# Patient Record
Sex: MALE | Race: WHITE | NOT HISPANIC OR LATINO | URBAN - METROPOLITAN AREA
[De-identification: names, ages, dates, MRNs, and addresses within clinical notes are randomized per-mention and may not be internally consistent; named-entity substitution may affect disease eponyms.]

---

## 2017-01-05 ENCOUNTER — OUTPATIENT (OUTPATIENT)
Dept: OUTPATIENT SERVICES | Facility: HOSPITAL | Age: 27
LOS: 1 days | End: 2017-01-05
Payer: MEDICAID

## 2017-01-05 DIAGNOSIS — K08.9 DISORDER OF TEETH AND SUPPORTING STRUCTURES, UNSPECIFIED: ICD-10-CM

## 2017-01-05 PROCEDURE — D0120: CPT

## 2017-01-05 PROCEDURE — D1110: CPT

## 2017-01-06 DIAGNOSIS — Z01.20 ENCOUNTER FOR DENTAL EXAMINATION AND CLEANING WITHOUT ABNORMAL FINDINGS: ICD-10-CM

## 2017-08-10 ENCOUNTER — OUTPATIENT (OUTPATIENT)
Dept: OUTPATIENT SERVICES | Facility: HOSPITAL | Age: 27
LOS: 1 days | End: 2017-08-10
Payer: MEDICAID

## 2017-08-10 DIAGNOSIS — K08.9 DISORDER OF TEETH AND SUPPORTING STRUCTURES, UNSPECIFIED: ICD-10-CM

## 2017-08-10 PROCEDURE — D1110: CPT

## 2017-08-10 PROCEDURE — D0120: CPT

## 2017-08-10 PROCEDURE — D0274: CPT

## 2017-08-11 DIAGNOSIS — Z01.20 ENCOUNTER FOR DENTAL EXAMINATION AND CLEANING WITHOUT ABNORMAL FINDINGS: ICD-10-CM

## 2018-03-22 ENCOUNTER — OUTPATIENT (OUTPATIENT)
Dept: OUTPATIENT SERVICES | Facility: HOSPITAL | Age: 28
LOS: 1 days | End: 2018-03-22
Payer: MEDICAID

## 2018-03-22 DIAGNOSIS — Z01.20 ENCOUNTER FOR DENTAL EXAMINATION AND CLEANING WITHOUT ABNORMAL FINDINGS: ICD-10-CM

## 2018-03-22 DIAGNOSIS — K08.9 DISORDER OF TEETH AND SUPPORTING STRUCTURES, UNSPECIFIED: ICD-10-CM

## 2018-03-22 PROCEDURE — D0120: CPT

## 2018-03-22 PROCEDURE — D1110: CPT

## 2018-03-22 PROCEDURE — D0274: CPT

## 2018-03-22 PROCEDURE — D0230: CPT

## 2018-03-22 PROCEDURE — D0220: CPT

## 2018-10-04 ENCOUNTER — OUTPATIENT (OUTPATIENT)
Dept: OUTPATIENT SERVICES | Facility: HOSPITAL | Age: 28
LOS: 1 days | End: 2018-10-04
Payer: MEDICAID

## 2018-10-04 DIAGNOSIS — Z01.20 ENCOUNTER FOR DENTAL EXAMINATION AND CLEANING WITHOUT ABNORMAL FINDINGS: ICD-10-CM

## 2018-10-04 DIAGNOSIS — K08.9 DISORDER OF TEETH AND SUPPORTING STRUCTURES, UNSPECIFIED: ICD-10-CM

## 2018-10-04 PROCEDURE — D1110: CPT

## 2018-10-04 PROCEDURE — D0120: CPT

## 2018-10-10 ENCOUNTER — OUTPATIENT (OUTPATIENT)
Dept: OUTPATIENT SERVICES | Facility: HOSPITAL | Age: 28
LOS: 1 days | End: 2018-10-10
Payer: COMMERCIAL

## 2018-10-10 VITALS
RESPIRATION RATE: 16 BRPM | TEMPERATURE: 99 F | DIASTOLIC BLOOD PRESSURE: 87 MMHG | HEART RATE: 72 BPM | OXYGEN SATURATION: 97 % | WEIGHT: 203.05 LBS | SYSTOLIC BLOOD PRESSURE: 126 MMHG

## 2018-10-10 DIAGNOSIS — G40.909 EPILEPSY, UNSPECIFIED, NOT INTRACTABLE, WITHOUT STATUS EPILEPTICUS: ICD-10-CM

## 2018-10-10 DIAGNOSIS — Z01.818 ENCOUNTER FOR OTHER PREPROCEDURAL EXAMINATION: ICD-10-CM

## 2018-10-10 DIAGNOSIS — N20.0 CALCULUS OF KIDNEY: ICD-10-CM

## 2018-10-10 LAB
ALBUMIN SERPL ELPH-MCNC: 4.1 G/DL — SIGNIFICANT CHANGE UP (ref 3.3–5)
ALP SERPL-CCNC: 40 U/L — SIGNIFICANT CHANGE UP (ref 40–120)
ALT FLD-CCNC: 52 U/L — SIGNIFICANT CHANGE UP (ref 12–78)
ANION GAP SERPL CALC-SCNC: 7 MMOL/L — SIGNIFICANT CHANGE UP (ref 5–17)
APPEARANCE UR: CLEAR — SIGNIFICANT CHANGE UP
AST SERPL-CCNC: 25 U/L — SIGNIFICANT CHANGE UP (ref 15–37)
BILIRUB SERPL-MCNC: 0.4 MG/DL — SIGNIFICANT CHANGE UP (ref 0.2–1.2)
BILIRUB UR-MCNC: NEGATIVE — SIGNIFICANT CHANGE UP
BUN SERPL-MCNC: 17 MG/DL — SIGNIFICANT CHANGE UP (ref 7–23)
CALCIUM SERPL-MCNC: 8.8 MG/DL — SIGNIFICANT CHANGE UP (ref 8.5–10.1)
CHLORIDE SERPL-SCNC: 106 MMOL/L — SIGNIFICANT CHANGE UP (ref 96–108)
CO2 SERPL-SCNC: 26 MMOL/L — SIGNIFICANT CHANGE UP (ref 22–31)
COLOR SPEC: YELLOW — SIGNIFICANT CHANGE UP
CREAT SERPL-MCNC: 1.1 MG/DL — SIGNIFICANT CHANGE UP (ref 0.5–1.3)
DIFF PNL FLD: NEGATIVE — SIGNIFICANT CHANGE UP
GLUCOSE SERPL-MCNC: 111 MG/DL — HIGH (ref 70–99)
GLUCOSE UR QL: NEGATIVE — SIGNIFICANT CHANGE UP
HCT VFR BLD CALC: 40.9 % — SIGNIFICANT CHANGE UP (ref 39–50)
HGB BLD-MCNC: 14 G/DL — SIGNIFICANT CHANGE UP (ref 13–17)
KETONES UR-MCNC: NEGATIVE — SIGNIFICANT CHANGE UP
LEUKOCYTE ESTERASE UR-ACNC: NEGATIVE — SIGNIFICANT CHANGE UP
MCHC RBC-ENTMCNC: 29.3 PG — SIGNIFICANT CHANGE UP (ref 27–34)
MCHC RBC-ENTMCNC: 34.2 GM/DL — SIGNIFICANT CHANGE UP (ref 32–36)
MCV RBC AUTO: 85.6 FL — SIGNIFICANT CHANGE UP (ref 80–100)
NITRITE UR-MCNC: NEGATIVE — SIGNIFICANT CHANGE UP
NRBC # BLD: 0 /100 WBCS — SIGNIFICANT CHANGE UP (ref 0–0)
PH UR: 7 — SIGNIFICANT CHANGE UP (ref 5–8)
PLATELET # BLD AUTO: 290 K/UL — SIGNIFICANT CHANGE UP (ref 150–400)
POTASSIUM SERPL-MCNC: 3.7 MMOL/L — SIGNIFICANT CHANGE UP (ref 3.5–5.3)
POTASSIUM SERPL-SCNC: 3.7 MMOL/L — SIGNIFICANT CHANGE UP (ref 3.5–5.3)
PROT SERPL-MCNC: 8 G/DL — SIGNIFICANT CHANGE UP (ref 6–8.3)
PROT UR-MCNC: NEGATIVE — SIGNIFICANT CHANGE UP
RBC # BLD: 4.78 M/UL — SIGNIFICANT CHANGE UP (ref 4.2–5.8)
RBC # FLD: 11.7 % — SIGNIFICANT CHANGE UP (ref 10.3–14.5)
SODIUM SERPL-SCNC: 139 MMOL/L — SIGNIFICANT CHANGE UP (ref 135–145)
SP GR SPEC: 1 — LOW (ref 1.01–1.02)
UROBILINOGEN FLD QL: NEGATIVE — SIGNIFICANT CHANGE UP
WBC # BLD: 6.16 K/UL — SIGNIFICANT CHANGE UP (ref 3.8–10.5)
WBC # FLD AUTO: 6.16 K/UL — SIGNIFICANT CHANGE UP (ref 3.8–10.5)

## 2018-10-10 PROCEDURE — 93005 ELECTROCARDIOGRAM TRACING: CPT

## 2018-10-10 PROCEDURE — 36415 COLL VENOUS BLD VENIPUNCTURE: CPT

## 2018-10-10 PROCEDURE — 93010 ELECTROCARDIOGRAM REPORT: CPT | Mod: NC

## 2018-10-10 PROCEDURE — 81003 URINALYSIS AUTO W/O SCOPE: CPT

## 2018-10-10 PROCEDURE — 80053 COMPREHEN METABOLIC PANEL: CPT

## 2018-10-10 PROCEDURE — 87086 URINE CULTURE/COLONY COUNT: CPT

## 2018-10-10 PROCEDURE — G0463: CPT

## 2018-10-10 PROCEDURE — 85027 COMPLETE CBC AUTOMATED: CPT

## 2018-10-10 NOTE — H&P PST ADULT - NSANTHOSAYNRD_GEN_A_CORE
No. ADEN screening performed.  STOP BANG Legend: 0-2 = LOW Risk; 3-4 = INTERMEDIATE Risk; 5-8 = HIGH Risk

## 2018-10-10 NOTE — H&P PST ADULT - PROBLEM SELECTOR PLAN 2
Last neurology office visit note obtained. Pt to continue anti-seizure meds and take them morning of surgery.

## 2018-10-10 NOTE — H&P PST ADULT - PROBLEM SELECTOR PLAN 3
Medical clearance needed. CBC, Comprehensive panel, UA, Urine culture and EKG ordered. Pre-op instructions given and pt's medical aid (Temy) verbalized understanding. Medical clearance needed. CBC, Comprehensive panel, UA, Urine culture and EKG ordered. Pre-op instructions given and pt's medical aid (Temy) verbalized understanding. Father will bring pt to hospital and give consent for procedure.

## 2018-10-10 NOTE — H&P PST ADULT - PMH
ADHD    Calculus of kidney    CKD (chronic kidney disease), stage III    Corpus callosum agenesis    Hyperlipidemia    Hypertension    MR (mental retardation), moderate    Seasonal allergies    Seizure disorder

## 2018-10-10 NOTE — H&P PST ADULT - NEGATIVE CARDIOVASCULAR SYMPTOMS
no chest pain/no dyspnea on exertion/no orthopnea/no palpitations/no paroxysmal nocturnal dyspnea/no claudication/no peripheral edema

## 2018-10-10 NOTE — H&P PST ADULT - HISTORY OF PRESENT ILLNESS
28yo male with PMH of moderate MR, ADHD, Seizure disorder, HTN and CKD here for PST. Pt was referred to urologist for increased BP and s/p sonogram and KUB and was diagnosed with 6mm calcification in the periphery of the lower pole of the right kidney. Pt denies dysuria, hematuria and other voiding complaints. Pt denies abdominal and right flank pain. Pt scheduled for stage right ESWL on 10/16/18.

## 2018-10-10 NOTE — H&P PST ADULT - NEUROLOGICAL COMMENTS
As per medical aide, pt with last seizure "about 2 weeks ago", Pt follows up with neurologist Dr. Mendoza.

## 2018-10-10 NOTE — H&P PST ADULT - RS GEN PE MLT RESP DETAILS PC
breath sounds equal/clear to auscultation bilaterally/good air movement/airway patent/respirations non-labored/normal

## 2018-10-11 LAB
CULTURE RESULTS: SIGNIFICANT CHANGE UP
SPECIMEN SOURCE: SIGNIFICANT CHANGE UP

## 2018-10-15 ENCOUNTER — TRANSCRIPTION ENCOUNTER (OUTPATIENT)
Age: 28
End: 2018-10-15

## 2018-10-16 ENCOUNTER — OUTPATIENT (OUTPATIENT)
Dept: OUTPATIENT SERVICES | Facility: HOSPITAL | Age: 28
LOS: 1 days | End: 2018-10-16
Payer: COMMERCIAL

## 2018-10-16 VITALS
RESPIRATION RATE: 12 BRPM | SYSTOLIC BLOOD PRESSURE: 130 MMHG | HEART RATE: 59 BPM | OXYGEN SATURATION: 97 % | DIASTOLIC BLOOD PRESSURE: 84 MMHG

## 2018-10-16 VITALS
OXYGEN SATURATION: 95 % | WEIGHT: 203.05 LBS | RESPIRATION RATE: 13 BRPM | HEART RATE: 74 BPM | TEMPERATURE: 98 F | DIASTOLIC BLOOD PRESSURE: 95 MMHG | SYSTOLIC BLOOD PRESSURE: 146 MMHG

## 2018-10-16 DIAGNOSIS — N20.0 CALCULUS OF KIDNEY: ICD-10-CM

## 2018-10-16 DIAGNOSIS — Z01.818 ENCOUNTER FOR OTHER PREPROCEDURAL EXAMINATION: ICD-10-CM

## 2018-10-16 PROCEDURE — 50590 FRAGMENTING OF KIDNEY STONE: CPT | Mod: RT

## 2018-10-16 RX ORDER — HYDROMORPHONE HYDROCHLORIDE 2 MG/ML
0.5 INJECTION INTRAMUSCULAR; INTRAVENOUS; SUBCUTANEOUS
Qty: 0 | Refills: 0 | Status: DISCONTINUED | OUTPATIENT
Start: 2018-10-16 | End: 2018-10-16

## 2018-10-16 RX ORDER — POTASSIUM CITRATE MONOHYDRATE 100 %
0 POWDER (GRAM) MISCELLANEOUS
Qty: 0 | Refills: 0 | COMMUNITY

## 2018-10-16 RX ORDER — QUETIAPINE FUMARATE 200 MG/1
2 TABLET, FILM COATED ORAL
Qty: 0 | Refills: 0 | COMMUNITY

## 2018-10-16 RX ORDER — QUETIAPINE FUMARATE 200 MG/1
0 TABLET, FILM COATED ORAL
Qty: 0 | Refills: 0 | COMMUNITY

## 2018-10-16 RX ORDER — ONDANSETRON 8 MG/1
4 TABLET, FILM COATED ORAL ONCE
Qty: 0 | Refills: 0 | Status: DISCONTINUED | OUTPATIENT
Start: 2018-10-16 | End: 2018-10-16

## 2018-10-16 RX ORDER — SODIUM CHLORIDE 9 MG/ML
1000 INJECTION, SOLUTION INTRAVENOUS
Qty: 0 | Refills: 0 | Status: DISCONTINUED | OUTPATIENT
Start: 2018-10-16 | End: 2018-10-16

## 2018-10-16 RX ORDER — SODIUM CHLORIDE 9 MG/ML
3 INJECTION INTRAMUSCULAR; INTRAVENOUS; SUBCUTANEOUS ONCE
Qty: 0 | Refills: 0 | Status: COMPLETED | OUTPATIENT
Start: 2018-10-16 | End: 2018-10-16

## 2018-10-16 RX ADMIN — SODIUM CHLORIDE 30 MILLILITER(S): 9 INJECTION, SOLUTION INTRAVENOUS at 09:08

## 2018-10-16 RX ADMIN — SODIUM CHLORIDE 3 MILLILITER(S): 9 INJECTION INTRAMUSCULAR; INTRAVENOUS; SUBCUTANEOUS at 09:09

## 2018-10-16 RX ADMIN — SODIUM CHLORIDE 100 MILLILITER(S): 9 INJECTION, SOLUTION INTRAVENOUS at 10:40

## 2018-10-16 NOTE — ASU DISCHARGE PLAN (ADULT/PEDIATRIC). - MEDICATION SUMMARY - MEDICATIONS TO TAKE
I will START or STAY ON the medications listed below when I get home from the hospital:    cloNIDine 0.1 mg oral tablet  -- orally 3 times a day  -- Indication: For per PCP    Aptiom 600 mg oral tablet  -- 2 tab(s) by mouth once a day  -- Indication: For per PCP    Vimpat 100 mg oral tablet  -- 1 tab(s) by mouth 2 times a day  -- Indication: For per PCP    cetirizine 10 mg oral tablet  -- 1 tab(s) by mouth once a day  -- Indication: For per PCP    Zocor 10 mg oral tablet  -- 1 tab(s) by mouth once a day (at bedtime)  -- Indication: For per PCP    TriCor 48 mg oral tablet  -- 1 tab(s) by mouth once a day  -- Indication: For per PCP    Bystolic 10 mg oral tablet  -- 1 tab(s) by mouth once a day  -- Indication: For per PCP    Norvasc 5 mg oral tablet  -- orally 2 times a day  -- Indication: For per PCP    mometasone 0.1% topical cream  -- Apply on skin to affected area once a day, As Needed  -- Indication: For per PCP    Eucerin topical lotion  -- Apply on skin to affected area 2 times a day  -- Indication: For per PCP    Nasacort AQ 55 mcg/inh nasal spray  -- 1 spray(s) into nose 2 times a day  -- Indication: For per PCP    Fish Oil 1000 mg oral capsule  -- orally 2 times a day  -- Indication: For per PCP    Pazeo 0.7% ophthalmic solution  -- 1 drop(s) to each affected eye once a day  -- Indication: For per PCP    Restasis 0.05% ophthalmic emulsion  -- 1 drop(s) to each affected eye every 12 hours  -- Indication: For per PCP    Multiple Vitamins oral tablet  -- 1 tab(s) by mouth once a day  -- Indication: For per PCP

## 2018-10-16 NOTE — BRIEF OPERATIVE NOTE - PROCEDURE
<<-----Click on this checkbox to enter Procedure ESWL kid/ureter/bladder  10/16/2018  RK  Active  [id number]

## 2018-10-16 NOTE — ASU DISCHARGE PLAN (ADULT/PEDIATRIC). - NOTIFY
Unable to Urinate/Pain not relieved by Medications/Fever greater than 101/Persistent Nausea and Vomiting/Bleeding that does not stop/Inability to Tolerate Liquids or Foods

## 2019-03-11 ENCOUNTER — TRANSCRIPTION ENCOUNTER (OUTPATIENT)
Age: 29
End: 2019-03-11

## 2019-04-11 ENCOUNTER — OUTPATIENT (OUTPATIENT)
Dept: OUTPATIENT SERVICES | Facility: HOSPITAL | Age: 29
LOS: 1 days | End: 2019-04-11
Payer: MEDICARE

## 2019-04-11 DIAGNOSIS — K08.9 DISORDER OF TEETH AND SUPPORTING STRUCTURES, UNSPECIFIED: ICD-10-CM

## 2019-04-11 PROBLEM — F71 MODERATE INTELLECTUAL DISABILITIES: Chronic | Status: ACTIVE | Noted: 2018-10-10

## 2019-04-11 PROBLEM — N18.3 CHRONIC KIDNEY DISEASE, STAGE 3 (MODERATE): Chronic | Status: ACTIVE | Noted: 2018-10-10

## 2019-04-11 PROBLEM — G40.909 EPILEPSY, UNSPECIFIED, NOT INTRACTABLE, WITHOUT STATUS EPILEPTICUS: Chronic | Status: ACTIVE | Noted: 2018-10-10

## 2019-04-11 PROBLEM — Q04.0 CONGENITAL MALFORMATIONS OF CORPUS CALLOSUM: Chronic | Status: ACTIVE | Noted: 2018-10-10

## 2019-04-11 PROBLEM — E78.5 HYPERLIPIDEMIA, UNSPECIFIED: Chronic | Status: ACTIVE | Noted: 2018-10-10

## 2019-04-11 PROBLEM — J30.2 OTHER SEASONAL ALLERGIC RHINITIS: Chronic | Status: ACTIVE | Noted: 2018-10-10

## 2019-04-11 PROBLEM — F90.9 ATTENTION-DEFICIT HYPERACTIVITY DISORDER, UNSPECIFIED TYPE: Chronic | Status: ACTIVE | Noted: 2018-10-10

## 2019-04-11 PROBLEM — N20.0 CALCULUS OF KIDNEY: Chronic | Status: ACTIVE | Noted: 2018-10-10

## 2019-04-11 PROBLEM — I10 ESSENTIAL (PRIMARY) HYPERTENSION: Chronic | Status: ACTIVE | Noted: 2018-10-10

## 2019-04-11 PROCEDURE — D1110: CPT

## 2019-04-11 PROCEDURE — D0120: CPT

## 2019-04-12 DIAGNOSIS — Z01.20 ENCOUNTER FOR DENTAL EXAMINATION AND CLEANING WITHOUT ABNORMAL FINDINGS: ICD-10-CM

## 2020-03-02 NOTE — ASU PATIENT PROFILE, ADULT - NS TRANSFER DENTURES
Indication: Dyspnea

 

Comparison:  9/5/2019

 

A single view chest radiograph was obtained.

 

Findings:

 

There are surgical clips in the left axilla. The heart is enlarged. Lungs are clear.

Aorta is mildly calcified. Bones are osteopenic.

 

IMPRESSION:

 

No significant change. No acute findings
Indication:Elevated Bun and Creatinine.

 

Technique: Grayscale and duplex Doppler imaging of the kidneys performed.

 

Comparison: None

 

Findings:

 

The size, contour, and echogenicity of both kidneys are within normal limits. There

is no hydronephrosis.. The right kidney measures 9.1 cm. in length. The left kidney

measures 9.7 cm. in length. There is a 5 mm cyst in the left kidney.

 

The IVC is patent. Urinary bladder is unremarkable.

 

IMPRESSION:

 

Negative ultrasound the kidneys. 5 mm left renal cyst.
Indication:Leg pain and swelling

 

Technique: Grayscale and duplex Doppler imaging of the veins in both lower

extremities performed in real time utilizing compression and augmentation.

 

Comparison: None

 

Findings:

 

Duplex Doppler interrogation of the veins in both lower extremity is performed from

the common femoral vein to the popliteal vein.

 

Left side is abnormal. There is thrombus within the left common femoral vein and

superficial femoral vein. The popliteal vein is patent.

 

Right leg is normal. Normal venous compressibility demonstrated throughout. No

thrombus identified. Waveform analysis shows good respiratory phasicity and

augmentation.

 

IMPRESSION:

 

Acute DVT in the left common femoral vein and superficial femoral vein
none

## 2020-06-29 NOTE — H&P PST ADULT - BACK
Physical Therapy Daily Progress Note      Visit # 7      Subjective   Pt reports shoulder feels better. No pain currently. Reports pain x1 doing doorway stretch this weekend.    Objective   See Exercise, Manual, and Modality Logs for complete treatment.       Assessment/Plan  Subjective reports of pain improving. Decreased tenderness of proximal bicep tendon. Updated HEP to include banded horizontal abduction. Progress per POC.         Manual Therapy:    15     mins  55450;  Therapeutic Exercise:    15     mins  39261;     Neuromuscular Ray:    0    mins  95253;    Therapeutic Activity:     0     mins  08106;     Gait Trainin     mins  80948;     Ultrasound:     8     mins  13883;    Work Hardening           0      mins 87715  Iontophoresis               0   mins 62506    Timed Treatment:   38   mins   Total Treatment:     45   mins    Joanne Romero, PT  Physical Therapist  
detailed exam

## 2020-10-08 ENCOUNTER — EMERGENCY (EMERGENCY)
Facility: HOSPITAL | Age: 30
LOS: 1 days | Discharge: HOME | End: 2020-10-08
Attending: EMERGENCY MEDICINE | Admitting: EMERGENCY MEDICINE
Payer: COMMERCIAL

## 2020-10-08 VITALS
TEMPERATURE: 97 F | OXYGEN SATURATION: 99 % | RESPIRATION RATE: 14 BRPM | HEART RATE: 76 BPM | WEIGHT: 222.01 LBS | HEIGHT: 66 IN | DIASTOLIC BLOOD PRESSURE: 87 MMHG | SYSTOLIC BLOOD PRESSURE: 160 MMHG

## 2020-10-08 VITALS
SYSTOLIC BLOOD PRESSURE: 145 MMHG | DIASTOLIC BLOOD PRESSURE: 88 MMHG | OXYGEN SATURATION: 99 % | TEMPERATURE: 97 F | HEART RATE: 80 BPM | RESPIRATION RATE: 14 BRPM

## 2020-10-08 LAB — SARS-COV-2 RNA SPEC QL NAA+PROBE: SIGNIFICANT CHANGE UP

## 2020-10-08 PROCEDURE — 81003 URINALYSIS AUTO W/O SCOPE: CPT

## 2020-10-08 PROCEDURE — U0003: CPT

## 2020-10-08 PROCEDURE — 87086 URINE CULTURE/COLONY COUNT: CPT

## 2020-10-08 PROCEDURE — 99283 EMERGENCY DEPT VISIT LOW MDM: CPT

## 2020-10-08 NOTE — ED PROVIDER NOTE - CARE PROVIDER_API CALL
Zachariah Chandler  UROLOGY  [address], [address]  [place] [address]  Phone: [phone]  Fax: [phone]  Follow Up Time: Routine

## 2020-10-08 NOTE — ED PROVIDER NOTE - PATIENT PORTAL LINK FT
You can access the FollowMyHealth Patient Portal offered by [facility] by registering at the following website: [email or web address]. By joining [employer]’s FollowMyHealth portal, you will also be able to view your health information using other applications (apps) compatible with our system.

## 2020-10-08 NOTE — ED PROVIDER NOTE - NSFOLLOWUPINSTRUCTIONS_ED_ALL_ED_FT
DYSURIA - AfterCare(R) Instructions(ER/ED)           Dysuria    WHAT YOU NEED TO KNOW:    Dysuria is difficulty urinating, or pain, burning, or discomfort with urination. Dysuria is usually a symptom of another problem.     DISCHARGE INSTRUCTIONS:    Return to the emergency department if:   •You have severe back, side, or abdominal pain.       •You have fever and shaking chills.       •You vomit several times in a row.       Contact your healthcare provider if:   •Your symptoms do not go away, even after treatment.       •You have questions or concerns about your condition or care.       Medicines:   •Medicines may be given to help treat a bacterial infection or help decrease bladder spasms.      •Take your medicine as directed. Contact your healthcare provider if you think your medicine is not helping or if you have side effects. Tell him of her if you are allergic to any medicine. Keep a list of the medicines, vitamins, and herbs you take. Include the amounts, and when and why you take them. Bring the list or the pill bottles to follow-up visits. Carry your medicine list with you in case of an emergency.      Follow up with your healthcare provider as directed: Your healthcare provider may also refer you to a urologist or nephrologist to have additional testing. Write down your questions so you remember to ask them during your visits.     Manage your dysuria:   •Drink more liquids. Liquids help flush out bacteria that may be causing an infection. Ask your healthcare provider how much liquid to drink each day and which liquids are best for you.       •Take sitz baths as directed. Fill a bathtub with 4 to 6 inches of warm water. You may also use a sitz bath pan that fits over a toilet. Sit in the sitz bath for 20 minutes. Do this 2 to 3 times a day, or as directed. The warm water can help decrease pain and swelling.          © Copyright [employer] 2020           back to top                          © Copyright [employer] 2020

## 2020-10-08 NOTE — ED ADULT NURSE NOTE - NSIMPLEMENTINTERV_GEN_ALL_ED
Implemented All Fall with Harm Risk Interventions:  [place] to call system. Call bell, personal items and telephone within reach. Instruct patient to call for assistance. Room bathroom lighting operational. Non-slip footwear when patient is off stretcher. Physically safe environment: no spills, clutter or unnecessary equipment. Stretcher in lowest position, wheels locked, appropriate side rails in place. Provide visual cue, wrist band, yellow gown, etc. Monitor gait and stability. Monitor for mental status changes and reorient to person, place, and time. Review medications for side effects contributing to fall risk. Reinforce activity limits and safety measures with patient and family. Provide visual clues: red socks.

## 2020-10-08 NOTE — ED ADULT NURSE NOTE - OBJECTIVE STATEMENT
Patient c/o painful urination since last night. Only when he urinates. No hematuria. Patient c/o painful urination since last night. Only when he urinates. No hematuria. HX of MR, staff with patient.

## 2020-10-08 NOTE — ED PROVIDER NOTE - ATTENDING CONTRIBUTION TO CARE
Pt is a 30 yo female who presents tot he ED s/p episode of dysuria.  PMHx of ADHD, Calculus of kidney    CKD (chronic kidney disease), stage III, Corpus callosum agenesis, Hyperlipidemia, Hypertension    MR (mental retardation), moderate, Seasonal allergies, Seizure disorder.  Pt reports that last night he had difficulty urinating and reported that when he was finally able to urinate he noted dysuria.  States that these symptoms have since resolved but did inform staff this am and was sent to the ED for further work up. Denies fever, chills, N/V, CP, SOB, abd pain.  Pt otherwise at baseline per staff.  History but be considered in context as pt with developmental delay. On exam pt lying in bed NAD, heart RR, lungs CTA, abd soft NT/ND.  Pt presenting with episode of dysuria and difficulty urinating.  Will obtain UA, urine culture, pre and post void residual and consult with pt urologist who was notified that pt was taken to the ED.

## 2020-10-08 NOTE — ED PROVIDER NOTE - PROGRESS NOTE DETAILS
spoke to urologist dr marquez advised do bladder scan, if normal pt can follow up in office. pt has renal us scheduled next week. pt with 200cc in bladder after void 112cc. consulted dr marquez advised pt can be dc. advised out pt follow up with dr marquez father aware all questions answered.

## 2020-10-08 NOTE — ED PROVIDER NOTE - CLINICAL SUMMARY MEDICAL DECISION MAKING FREE TEXT BOX
pt is a 30yo male with pmhx of CKD, hld, seizure disorder, adhd, mental retardation, presents with dysuria. pt reports last night he had painful urination with difficulty urinating that since resolved. pt without complaints at this time? pt exam without acute findings. will do ua to r/o uti, cx, bladder scan and covid.

## 2020-10-08 NOTE — ED ADULT NURSE REASSESSMENT NOTE - NS ED NURSE REASSESS COMMENT FT1
Spoke to father and he stated that he declines Woods at this time for his son. Because he is concern his son won't tolerate having it in place.

## 2020-10-08 NOTE — ED PROVIDER NOTE - OBJECTIVE STATEMENT
pt is a 30yo male with pmhx of CKD, hld, seizure disorder, adhd, mental retardation, presents with dysuria. pt reports last night he had painful urination with difficulty urinating that since resolved. pt without complaints at this time?

## 2021-01-14 ENCOUNTER — OUTPATIENT (OUTPATIENT)
Dept: OUTPATIENT SERVICES | Facility: HOSPITAL | Age: 31
LOS: 1 days | End: 2021-01-14
Payer: MEDICARE

## 2021-01-14 DIAGNOSIS — K08.9 DISORDER OF TEETH AND SUPPORTING STRUCTURES, UNSPECIFIED: ICD-10-CM

## 2021-01-14 PROCEDURE — D0220: CPT

## 2021-01-14 PROCEDURE — D1110: CPT

## 2021-01-14 PROCEDURE — D0230: CPT

## 2021-01-14 PROCEDURE — D0120: CPT

## 2021-01-14 PROCEDURE — D0274: CPT

## 2021-01-26 DIAGNOSIS — Z01.20 ENCOUNTER FOR DENTAL EXAMINATION AND CLEANING WITHOUT ABNORMAL FINDINGS: ICD-10-CM

## 2021-07-22 ENCOUNTER — OUTPATIENT (OUTPATIENT)
Dept: OUTPATIENT SERVICES | Facility: HOSPITAL | Age: 31
LOS: 1 days | End: 2021-07-22
Payer: MEDICARE

## 2021-07-22 DIAGNOSIS — K08.9 DISORDER OF TEETH AND SUPPORTING STRUCTURES, UNSPECIFIED: ICD-10-CM

## 2021-07-22 PROCEDURE — D0120: CPT

## 2021-07-22 PROCEDURE — D1110: CPT

## 2021-07-29 DIAGNOSIS — Z01.20 ENCOUNTER FOR DENTAL EXAMINATION AND CLEANING WITHOUT ABNORMAL FINDINGS: ICD-10-CM

## 2022-03-03 ENCOUNTER — OUTPATIENT (OUTPATIENT)
Dept: OUTPATIENT SERVICES | Facility: HOSPITAL | Age: 32
LOS: 1 days | End: 2022-03-03
Payer: MEDICARE

## 2022-03-03 DIAGNOSIS — K08.9 DISORDER OF TEETH AND SUPPORTING STRUCTURES, UNSPECIFIED: ICD-10-CM

## 2022-03-03 PROCEDURE — D0274: CPT

## 2022-03-03 PROCEDURE — D0120: CPT

## 2022-03-03 PROCEDURE — D1110: CPT

## 2022-03-04 DIAGNOSIS — Z01.20 ENCOUNTER FOR DENTAL EXAMINATION AND CLEANING WITHOUT ABNORMAL FINDINGS: ICD-10-CM

## 2022-09-08 ENCOUNTER — OUTPATIENT (OUTPATIENT)
Dept: OUTPATIENT SERVICES | Facility: HOSPITAL | Age: 32
LOS: 1 days | End: 2022-09-08
Payer: MEDICARE

## 2022-09-08 DIAGNOSIS — K08.9 DISORDER OF TEETH AND SUPPORTING STRUCTURES, UNSPECIFIED: ICD-10-CM

## 2022-09-08 PROCEDURE — D0120: CPT

## 2022-09-08 PROCEDURE — D1110: CPT

## 2022-09-15 DIAGNOSIS — Z01.20 ENCOUNTER FOR DENTAL EXAMINATION AND CLEANING WITHOUT ABNORMAL FINDINGS: ICD-10-CM

## 2022-10-14 ENCOUNTER — EMERGENCY (EMERGENCY)
Facility: HOSPITAL | Age: 32
LOS: 1 days | Discharge: HOME | End: 2022-10-14
Attending: EMERGENCY MEDICINE | Admitting: EMERGENCY MEDICINE
Payer: MEDICARE

## 2022-10-14 VITALS
TEMPERATURE: 98 F | HEART RATE: 59 BPM | RESPIRATION RATE: 18 BRPM | SYSTOLIC BLOOD PRESSURE: 123 MMHG | HEIGHT: 66 IN | DIASTOLIC BLOOD PRESSURE: 81 MMHG | WEIGHT: 190.04 LBS | OXYGEN SATURATION: 96 %

## 2022-10-14 PROCEDURE — 99283 EMERGENCY DEPT VISIT LOW MDM: CPT

## 2022-10-14 PROCEDURE — 73562 X-RAY EXAM OF KNEE 3: CPT | Mod: 26,RT

## 2022-10-14 PROCEDURE — 73562 X-RAY EXAM OF KNEE 3: CPT

## 2022-10-14 RX ORDER — LANOLIN/MINERAL OIL
1 LOTION (ML) TOPICAL
Qty: 0 | Refills: 0 | DISCHARGE

## 2022-10-14 RX ORDER — LACOSAMIDE 50 MG/1
1 TABLET ORAL
Qty: 0 | Refills: 0 | DISCHARGE

## 2022-10-14 RX ORDER — FENOFIBRATE,MICRONIZED 130 MG
1 CAPSULE ORAL
Qty: 0 | Refills: 0 | DISCHARGE

## 2022-10-14 RX ORDER — CETIRIZINE HYDROCHLORIDE 10 MG/1
1 TABLET ORAL
Qty: 0 | Refills: 0 | DISCHARGE

## 2022-10-14 RX ORDER — MOMETASONE FUROATE 1 MG/G
1 CREAM TOPICAL
Qty: 0 | Refills: 0 | DISCHARGE

## 2022-10-14 RX ORDER — CYCLOSPORINE 0.5 MG/ML
1 EMULSION OPHTHALMIC
Qty: 0 | Refills: 0 | DISCHARGE

## 2022-10-14 RX ORDER — AMLODIPINE BESYLATE 2.5 MG/1
0 TABLET ORAL
Qty: 0 | Refills: 0 | DISCHARGE

## 2022-10-14 RX ORDER — OLOPATADINE HYDROCHLORIDE 1 MG/ML
1 SOLUTION/ DROPS OPHTHALMIC
Qty: 0 | Refills: 0 | DISCHARGE

## 2022-10-14 RX ORDER — ESLICARBAZEPINE ACETATE 800 MG/1
2 TABLET ORAL
Qty: 0 | Refills: 0 | DISCHARGE

## 2022-10-14 RX ORDER — TRIAMCINOLONE ACETONIDE 55 MCG
1 AEROSOL, SPRAY (GRAM) NASAL
Qty: 0 | Refills: 0 | DISCHARGE

## 2022-10-14 RX ORDER — FENOFIBRATE,MICRONIZED 130 MG
0 CAPSULE ORAL
Qty: 0 | Refills: 0 | DISCHARGE

## 2022-10-14 RX ORDER — SIMVASTATIN 20 MG/1
1 TABLET, FILM COATED ORAL
Qty: 0 | Refills: 0 | DISCHARGE

## 2022-10-14 RX ORDER — NEBIVOLOL HYDROCHLORIDE 5 MG/1
1 TABLET ORAL
Qty: 0 | Refills: 0 | DISCHARGE

## 2022-10-14 NOTE — ED PROVIDER NOTE - NSFOLLOWUPINSTRUCTIONS_ED_ALL_ED_FT
1) Follow-up with your Primary Medical Doctor or referred doctor. Call today / next business day for prompt follow-up.  2) Return to Emergency room for any worsening or persistent pain, weakness, fever, or any other concerning symptoms.  3) See attached instruction sheets for additional information, including information regarding signs and symptoms to look out for, reasons to seek immediate care and other important instructions.  4) Follow-up with any specialists as discussed / noted as well.   5) tylenol every 6 hours as needed for pain  6) Bacitracin over wound twice a day      An abrasion is a cut or a scrape on the surface of the skin. An abrasion does not go through all the layers of the skin. It is important to care for an abrasion properly to prevent infection.      What are the causes?    This condition is caused by rubbing your skin on something or falling on a surface, such as the ground. When your skin rubs on something, some layers of skin may rub off.      What are the signs or symptoms?    The main symptom of this condition is a cut or a scrape. The cut or scrape may be bleeding, or it may appear red or pink. If your abrasion was caused by a fall, there may be a bruise under your cut or scrape.      How is this diagnosed?    An abrasion is diagnosed with a physical exam.      How is this treated?    Treatment for this condition depends on how large and deep the abrasion is. In most cases:  •Your abrasion will be cleaned with water and mild soap. This is done to remove any dirt or debris (such as tiny bits of glass or rock) that may be stuck in your wound.      •An antibiotic ointment may be applied to your abrasion to help prevent infection.      •A bandage (dressing) may be placed on your abrasion to keep it clean.      You may also need a tetanus shot.      Follow these instructions at home:    Medicines     •Take or apply over-the-counter and prescription medicines only as told by your health care provider.      •If you were prescribed an antibiotic medicine, use it as told by your health care provider. Do not stop using the antibiotic even if you start to feel better.      Wound care   •Clean your wound 1 or 2 times a day, or as told by your health care provider. To do this:  1.Wash your hands for at least 20 seconds with mild soap and water. Do this before and after you clean your wound.      2.Wash your wound with mild soap and water and then rinse off the soap.      3.Pat your wound dry with a clean towel. Do not rub your wound.        •Keep your dressing clean and dry as told by your health care provider.      •There are many different ways to close and cover a wound. Follow instructions from your health care provider about caring for your wound and about changing and removing your dressing. You may have to change your dressing one or more times a day, or as directed by your health care provider.    •Check your wound every day for signs of infection. Check for:  •Redness, especially a red streak that spreads out from your wound.      •Swelling or increased pain.      •Warmth.      •Blood, fluid, pus, or a bad smell.          Managing pain and swelling    •If directed, put ice on the injured area. To do this:  •Put ice in a plastic bag.       •Place a towel between your skin and the bag.       • Leave the ice on for 20 minutes, 2–3 times a day.      •Remove the ice if your skin turns bright red. This is very important. If you cannot feel pain, heat, or cold, you have a greater risk of damage to the area.        •If possible, raise (elevate) the injured area above the level of your heart while you are sitting or lying down.      General instructions     • Do not take baths, swim, or use a hot tub until your health care provider approves. Ask your doctor about taking showers or sponge baths.      •Keep all follow-up visits. This is important.        Contact a health care provider if:    •You received a tetanus shot, and you have swelling, severe pain, redness, or bleeding at your injection site.      •Your pain is not controlled with medicine.      •You have a fever.    •You have any of these signs of infection:  •Redness, swelling, or more pain around your wound.      •Warmth coming from your wound.      •Blood, fluid, pus, or a bad smell coming from your wound.          Get help right away if:    •You have a red streak spreading away from your wound.        Summary    •An abrasion is a cut or a scrape on the surface of the skin. Care for your abrasion properly to prevent infection.      •Clean your wound with mild soap and water 1 or 2 times a day or as often as told. Follow instructions from your health care provider about taking medicines and changing your bandage (dressing).      •Contact your health care provider if you have a fever or if you have redness, swelling, or more pain around your wound.      •Contact your health care provider if you have warmth, blood, fluid, pus, or a bad smell coming from your wound.      •Get help right away if there is a red streak spreading away from your wound.      This information is not intended to replace advice given to you by your health care provider. Make sure you discuss any questions you have with your health care provider.

## 2022-10-14 NOTE — ED PROVIDER NOTE - OBJECTIVE STATEMENT
31-year-old male history of CKD hyperlipidemia seizure disorder on Vimpat ADHD MRLiza  Brought in by EMS status post episode of absence seizure now resolved patient was getting out of the van and fell onto his right knee causing an abrasion.  Here with father at bedside.  No medications given prior to arrival.  As per father patient is at baseline, fall was witnessed, no head injury/strike seen as per .

## 2022-10-14 NOTE — ED ADULT NURSE NOTE - NSICDXPASTMEDICALHX_GEN_ALL_CORE_FT
PAST MEDICAL HISTORY:  ADHD     Allergic conjunctivitis     Calculus of kidney     CKD (chronic kidney disease), stage III     Corpus callosum agenesis     H/O allergic rhinitis     H/O constipation     History of BPH     Hyperlipidemia     Hypertension     MR (mental retardation), moderate     Seasonal allergies     Seizure disorder

## 2022-10-14 NOTE — ED ADULT NURSE NOTE - OBJECTIVE STATEMENT
as per EMT and caregiver; pt known seizure pt; had witnessed seizure and then fell to the ground sustaining an abrasion to his right knee. pt did not sustain head injury; no LOC. pt ambulatory from ambulance to ED stretcher

## 2022-10-14 NOTE — ED PROVIDER NOTE - PATIENT PORTAL LINK FT
You can access the FollowMyHealth Patient Portal offered by [facility] by registering at the following website: [email or web address]. By joining [email or web address] [email or web address] portal, you will also be able to view your health information using other applications (apps) compatible with our system.

## 2022-10-14 NOTE — ED PROVIDER NOTE - PHYSICAL EXAMINATION
Gen: MR  Head/eyes: NC/AT, PERRL  ENT: airway patent  Neck: supple  Pulm/lung: Bilateral clear BS  CV/heart: RRR  GI/Abd: soft, NT/ND, +BS, no guarding/rebound tenderness  Musculoskeletal: no edema/erythema/cyanosis  Skin: no rash, +right anterior knee abrasion  Neuro: AAOx3, grossly intact

## 2022-10-14 NOTE — ED PROVIDER NOTE - NSICDXPASTMEDICALHX_GEN_ALL_CORE_FT
PAST MEDICAL HISTORY:  ADHD     Calculus of kidney     CKD (chronic kidney disease), stage III     Corpus callosum agenesis     Hyperlipidemia     Hypertension     MR (mental retardation), moderate     Seasonal allergies     Seizure disorder

## 2022-10-14 NOTE — ED PROVIDER NOTE - NS ED ROS FT
Constitutional: - Fever, - Chills, - Anorexia, - Fatigue, - Night sweats  Eyes: - Discharge, - Irritation, - Redness, - Visual changes, - Light sensitivity, - Pain  EARS: - Ear Pain, - Tinnitus, - Decreased hearing  NOSE: - Congestion, - Epistaxis  MOUTH/THROAT: - Vocal Changes, - Drooling, - Sore throat  NECK: - Lumps, - Stiffness, - Pain  CV: - Palpitations, - Chest Pain, - Edema, - Syncope  RESP:  - Cough, - Shortness of Breath, - Dyspnea on Exertion, - Trouble speaking, - Pleuritic pain - Wheezing  GI: - Diarrhea, - Constipation, - Bloody stools, - Nausea, - Vomiting, - Abdominal Pain  : - Dysuria, -Frequency, - Hematuria, - Hesitancy, - Incontinence, - Saddle Anesthesia, - Abnormal discharge  MSK: - Myalgias, - Arthralgias, - Weakness, - Deformities, - Injuries  SKIN: - Color change, - Rash, - Swelling, - Ecchymosis, + Abrasion, - laceration  NEURO: - Change in behavior, - Dec. Alertness, - Headache, - Dizziness, - Change in speech, - Weakness, - Seizure-like activity, - Difficulty ambulating

## 2022-10-14 NOTE — ED ADULT NURSE REASSESSMENT NOTE - NS ED NURSE REASSESS COMMENT FT1
pt seen, examined and d/c by MD. MD aware of all results. pt d/c to group home caregiver and his father. both verbalized understanding of d/c instructions, including s/s of infection. pt ambulated unassisted in NAD. pt would not allow staff member to put DSD on right knee abrasion. MD aware

## 2023-03-08 NOTE — ED ADULT NURSE NOTE - NSSEPSISSUSPECTED_ED_A_ED
"    3/8/2023         RE: Edwina Zhou  [address] [facility] [address]        Dear Colleague,    Thank you for referring your patient, Edwina Zhou, to the [facility]. Please see a copy of my visit note below.    PATIENT HISTORY:  Dr. Story requested I see this patient for their foot issue.  Edwina Zhou is a 64 year old female who presents to clinic for pain to both feet.  She notes its to the ball of both feet.  8 out of 10 at its worst.  Notes that it is quite often worse with taking or increased activity.  Will be aching, tingling, sharp pain.  Denies specific injury.  Notes she does have fibromyalgia and pain to multiple other joints.  She is tried different shoes but it has not really helped.  She is wondering what is causing the pain and what can be done for it.    Review of Systems:  Patient denies fever, chills, rash, wound, stiffness, numbness, weakness, heart burn, blood in stool, chest pain with activity, calf pain when walking, shortness of breath with activity, chronic cough, easy bleeding/bruising, swelling of ankles, excessive thirst, fatigue, depression, anxiety.  Patient admits to limping at times.     PAST MEDICAL HISTORY:   Past Medical History:   Diagnosis Date     Allergic rhinitis, cause unspecified      Arthritis      Dysphagia     had esophagram 12/07 - tertiary contractions of esoph with some retention, EGD showed gastritis, duodenitis and esophagitis ,pt didn't start a PPI       Fatigue      Fatigue      Gastroesophageal reflux disease      H/O YISEL I     s/p culpo with bx x 2 and cryotherapy - ok for paps q3yrs starting 2012     Helicobacter pylori (H. pylori) infection in 2009     Hyperlipidemia LDL goal < 130 02/04/2005    lipitor \"attacked my muscles\" (Hx fibromyalgia); [id number] failed to lower lipids enough&=myalgias also , lovastatin 20mg = myalgias      Hypothyroidism, unspecified type- needs lab follow up and ? refills  " 01/12/2022     Lyme disease 08/20/2010     Myalgia and myositis, unspecified     fibromyalgia - doing an otc human growth  hormone      OBESITY NOS- hx of      lost 30+ pounds since 2002 breast reduction      Obesity- worsening fibromyalgia, back pain, pcos  12/01/2009     Polycystic ovaries      Postmenopausal since age 49      Shingles 08/20/2010        PAST SURGICAL HISTORY:   Past Surgical History:   Procedure Laterality Date     BREAST SURGERY  15+years ago    reduction     COLONOSCOPY  due again     COLONOSCOPY Left 08/02/2019    Procedure: COLONOSCOPY (FV);  Surgeon: Sung Shanks MD;  Location: [facility]     COMBINED CYSTOSCOPY, INSERT STENT URETER(S) Bilateral 02/13/2018    Procedure: COMBINED CYSTOSCOPY, INSERT STENT URETER(S);  cystoscopy with bilateral ureteral stent placement;  Surgeon: Stephen Muniz MD;  Location: [facility]     COSMETIC SURGERY       CYSTOSCOPY N/A 02/13/2018    Procedure: CYSTOSCOPY;   CYSTOSCOPY;  Surgeon: North Cai MD;  Location: [facility] OR     DAVINCI HYSTERECTOMY SUPRACERVICAL, SACROCOLPOPEXY, COMBINED N/A 02/13/2018    Procedure: COMBINED DAVINCI HYSTERECTOMY SUPRACERVICAL, SACROCOLPOPEXY;  Cystoscopy, bilateral ureteral stent placement,  Robotic supracervical hysterectomy, bilateral salpingo-oophorectomy and sacrocolpopexy,  cystoscopy, removal of ureteral stents;  Surgeon: North Cai MD;  Location: [facility]     EXAM UNDER ANESTHESIA PELVIC N/A 02/13/2018    Procedure: EXAM UNDER ANESTHESIA PELVIC;;  Surgeon: North Cai MD;  Location: [facility]     HYSTERECTOMY, PAP STILL INDICATED       KNEE SURGERY Right     Meniscal repair     left rotator cuff repair  08/2019    [facility]     ORTHOPEDIC SURGERY  ?    meniscus     SOFT TISSUE SURGERY  2019?    Shoulder - Rotator Cuff     SURGICAL HISTORY OF -   1990's    COLPO WITH BX X2 + CRYO     SURGICAL HISTORY OF -       WISDOM TEETH-SUBSEQ MINDY FXR=ORIF     SURGICAL HISTORY OF -   2002    breast reduction -  [facility]      TONSILLECTOMY  at age 29        MEDICATIONS:   Current Outpatient Medications:      Cholecalciferol (VITAMIN D3) 50 MCG (2000 UT) CAPS, Take 1 capsule by mouth daily 1 capsule daily (Patient not taking: Reported on 2/13/2023), Disp: , Rfl: 0     hydrocortisone 2.5 % cream, Apply very small amount to eyelids up to twice daily, Disp: 20 g, Rfl: 3     levothyroxine (SYNTHROID/LEVOTHROID) 25 MCG tablet, Take 1 tablet (25 mcg) by mouth daily, Disp: 90 tablet, Rfl: 3     loratadine (CLARITIN) 10 MG tablet, Take 1 tablet (10 mg) by mouth 2 times daily, Disp: , Rfl: 1 YEAR     Melatonin ER 5 MG TBCR, , Disp: , Rfl:      methylPREDNISolone (MEDROL DOSEPAK) 4 MG tablet therapy pack, Follow Package Directions (Patient not taking: Reported on 3/8/2023), Disp: 21 tablet, Rfl: 0     Omega-3 Fatty Acids (FISH OIL) 875 MG CHEW, 1 tab chewable twice daily (Patient not taking: Reported on 2/13/2023), Disp:  , Rfl:      rosuvastatin (CRESTOR) 5 MG tablet, Take 1 tablet (5 mg) by mouth daily, Disp: 90 tablet, Rfl: 3     tiZANidine (ZANAFLEX) 4 MG tablet, Take 1 tablet (4 mg) by mouth nightly as needed for muscle spasms, Disp: 30 tablet, Rfl: 0     ALLERGIES:    Allergies   Allergen Reactions     Atorvastatin      Muscle cramps with lipitor      Cephalexin      Started acyclovir and keflex together.  Unsure if drug reaction or viral exanthem.         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     Comment: [employer]   Tobacco Use     Smoking status: Never     Smokeless tobacco: Never   Vaping Use     Vaping Use: Never used   Substance and Sexual Activity     Alcohol use: Yes     Comment: 0-2 glasses of wine weekly     Drug use: No     Sexual activity: Yes      Partners: Male     Birth control/protection: Post-menopausal     Comment:  to Niraj Zhou    Other Topics Concern      Service No     Blood Transfusions No     Caffeine Concern Yes     Comment: diet soda and tea occasionally      Exercise Yes     Comment: doing [place] Weight loss - horse back riding, [place] track, hiking      Seat Belt Yes     Comment: always      Self-Exams Yes     Comment: SBE encouraged monthly      Parent/sibling w/ CABG, MI or angioplasty before 65F 55M? No   Social History Narrative    Found 6  - 1/2 siblings from her birth mother Angelica Bess - 3 prior to pt's birth from 1 father , then pt from a different father, then 3 from 3rd father . She has since passed away.  From complications of dementia.  - pt found them through 23&me.  Pt's daughter Brielle is doing family genealogy.  No genetic markers for dementia or breast cancer in pt's 23&me.  Pt's adoptive parents had passed when pt did her 23&me.  ---Phuong Story MD  - February 13, 2023      Social Determinants of Health     Financial Resource Strain: Not on file   Food Insecurity: Not on file   Transportation Needs: Not on file   Physical Activity: Not on file   Stress: Not on file   Social Connections: Not on file   Intimate Partner Violence: Not on file   Housing Stability: Not on file        FAMILY HISTORY:   Family History   Adopted: Yes   Problem Relation Age of Onset     Breast Cancer Mother         birth mother found through 23&me     Dementia Mother         happened after a hypoxic even     Unknown/Adopted Father         found through 23     Family History Negative Other         pt is adopted - no knowledge of family hx         EXAM:Vitals: /78   Wt 78 kg (172 lb)   LMP 06/03/2010   BMI 32.50 kg/m      General appearance: Patient is alert and fully cooperative with history & exam.  No sign of distress is noted during the visit.     Psychiatric: Affect is pleasant & appropriate.  Patient appears  motivated to improve health.     Respiratory: Breathing is regular & unlabored while sitting.     HEENT: Hearing is intact to spoken word.  Speech is clear.  No gross evidence of visual impairment that would impact ambulation.     Dermatologic: Skin is intact to both lower extremities without significant lesions, rash or abrasion.  No paronychia or evidence of soft tissue infection is noted.     Vascular: DP & PT pulses are intact & regular bilaterally.  No significant edema or varicosities noted.  CFT and skin temperature is normal to both lower extremities.     Neurologic: Lower extremity sensation is intact to light touch.  No evidence of weakness or contracture in the lower extremities.  No evidence of neuropathy.     Musculoskeletal: Patient is ambulatory without assistive device or brace.  Increased arch height.  Pain on palpation of the plantar distal aspects of the second metatarsal heads bilaterally.  Pain on palpation of the third intermetatarsal spaces bilaterally and with lateral compression of both feet.    Radiographs: foot xrays bilateral - I personally reviewed the xrays -increased arch height bilaterally.  No fractures are noted.  Second metatarsals are elongated compared to the first metatarsal.     ASSESSMENT:    Bilateral foot pain  Pes cavus  Holm's metatarsalgia, neuralgia, or neuroma, bilateral  Capsulitis of metatarsophalangeal (MTP) joints of both feet     Medical Decision Making/Plan:  Reviewed patient's chart in [facility].  Reviewed and discussed x-rays. Reviewed and discussed causes of capsulitis.  Talked about how the elongated 2nd metatarsal can cause more pressure to occur to the joint.  We talked about treatments such as padding, orthotics, injection, physical therapy, immobilization, MRI, and possible surgery to shorten metatarsal.     We discussed causes and treatments of neuromas.  These included shoe gear, orthotics, metatarsal pads, cortisone injections, ice, physical therapy, and  possible surgical removal.     At this time I recommend custom inserts given her foot type.  I feel this would be better than an over-the-counter and would likely address more of her issues.  An order was placed for this.  Also recommend over-the-counter topical pain cream.  She was given an order for physical therapy to try to help as well with trans electrical nerve stimulation and stretching.  If pain continues may get MRIs of the feet to assess further pathology.  All questions were answered to patient's satisfaction and she will call further questions or concerns..      Patient risk factor: Patient is at low risk for infection.        Yenni Srinivasan DPM, Podiatry/Foot and Ankle Surgery        Again, thank you for allowing me to participate in the care of your patient.        Sincerely,        Yenni Srinivasan DPM, Podiatry/Foot and Ankle Surgery     No

## 2023-03-15 ENCOUNTER — OFFICE (OUTPATIENT)
Dept: URBAN - METROPOLITAN AREA CLINIC 70 | Facility: CLINIC | Age: 33
Setting detail: OPHTHALMOLOGY
End: 2023-03-15
Payer: MEDICARE

## 2023-03-15 DIAGNOSIS — H16.223: ICD-10-CM

## 2023-03-15 DIAGNOSIS — H10.45: ICD-10-CM

## 2023-03-15 PROCEDURE — 92014 COMPRE OPH EXAM EST PT 1/>: CPT | Performed by: STUDENT IN AN ORGANIZED HEALTH CARE EDUCATION/TRAINING PROGRAM

## 2023-03-15 ASSESSMENT — KERATOMETRY
OS_K1POWER_DIOPTERS: 45.75
OD_K1POWER_DIOPTERS: 43.75
OD_K2POWER_DIOPTERS: 46.50
OS_AXISANGLE_DEGREES: 053
OD_AXISANGLE_DEGREES: 099
OS_K2POWER_DIOPTERS: 46.75

## 2023-03-15 ASSESSMENT — AXIALLENGTH_DERIVED
OS_AL: 23.6045
OD_AL: 24.916

## 2023-03-15 ASSESSMENT — VISUAL ACUITY
OD_BCVA: F&F
OS_BCVA: F&F

## 2023-03-15 ASSESSMENT — REFRACTION_AUTOREFRACTION
OS_AXIS: 106
OS_SPHERE: -2.25
OD_SPHERE: -3.00
OD_CYLINDER: -3.50
OD_AXIS: 011
OS_CYLINDER: -0.75

## 2023-03-15 ASSESSMENT — SUPERFICIAL PUNCTATE KERATITIS (SPK)
OS_SPK: 1+
OD_SPK: 1+

## 2023-03-15 ASSESSMENT — SPHEQUIV_DERIVED
OD_SPHEQUIV: -4.75
OS_SPHEQUIV: -2.625

## 2023-03-30 ENCOUNTER — OUTPATIENT (OUTPATIENT)
Dept: OUTPATIENT SERVICES | Facility: HOSPITAL | Age: 33
LOS: 1 days | End: 2023-03-30
Payer: MEDICARE

## 2023-03-30 DIAGNOSIS — K08.9 DISORDER OF TEETH AND SUPPORTING STRUCTURES, UNSPECIFIED: ICD-10-CM

## 2023-03-30 PROBLEM — Z87.19 PERSONAL HISTORY OF OTHER DISEASES OF THE DIGESTIVE SYSTEM: Chronic | Status: ACTIVE | Noted: 2022-10-14

## 2023-03-30 PROBLEM — Z87.438 PERSONAL HISTORY OF OTHER DISEASES OF MALE GENITAL ORGANS: Chronic | Status: ACTIVE | Noted: 2022-10-14

## 2023-03-30 PROBLEM — Z87.09 PERSONAL HISTORY OF OTHER DISEASES OF THE RESPIRATORY SYSTEM: Chronic | Status: ACTIVE | Noted: 2022-10-14

## 2023-03-30 PROBLEM — H10.10 ACUTE ATOPIC CONJUNCTIVITIS, UNSPECIFIED EYE: Chronic | Status: ACTIVE | Noted: 2022-10-14

## 2023-03-30 PROCEDURE — D1110: CPT

## 2023-03-30 PROCEDURE — D0120: CPT

## 2023-03-30 PROCEDURE — D0274: CPT

## 2023-04-13 DIAGNOSIS — Z01.20 ENCOUNTER FOR DENTAL EXAMINATION AND CLEANING WITHOUT ABNORMAL FINDINGS: ICD-10-CM

## 2023-10-05 ENCOUNTER — APPOINTMENT (OUTPATIENT)
Age: 33
End: 2023-10-05
Payer: MEDICARE

## 2023-10-05 ENCOUNTER — OUTPATIENT (OUTPATIENT)
Dept: OUTPATIENT SERVICES | Facility: HOSPITAL | Age: 33
LOS: 1 days | End: 2023-10-05
Payer: MEDICARE

## 2023-10-05 DIAGNOSIS — Z01.20 ENCOUNTER FOR DENTAL EXAMINATION AND CLEANING WITHOUT ABNORMAL FINDINGS: ICD-10-CM

## 2023-10-05 PROCEDURE — D0120: CPT

## 2023-10-05 PROCEDURE — D1110 PROPHYLAXIS - ADULT: CPT

## 2023-10-05 PROCEDURE — ZZZZZ: CPT

## 2023-10-05 PROCEDURE — D1110: CPT

## 2023-11-28 ENCOUNTER — NON-APPOINTMENT (OUTPATIENT)
Age: 33
End: 2023-11-28

## 2023-11-28 RX ORDER — CARVEDILOL 25 MG/1
25 TABLET, FILM COATED ORAL TWICE DAILY
Refills: 0 | Status: ACTIVE | COMMUNITY

## 2023-11-28 RX ORDER — CARVEDILOL 12.5 MG/1
12.5 TABLET, FILM COATED ORAL TWICE DAILY
Refills: 0 | Status: ACTIVE | COMMUNITY

## 2023-11-28 RX ORDER — NEBIVOLOL HYDROCHLORIDE 10 MG/1
10 TABLET ORAL DAILY
Refills: 0 | Status: ACTIVE | COMMUNITY

## 2023-12-11 ENCOUNTER — NON-APPOINTMENT (OUTPATIENT)
Age: 33
End: 2023-12-11

## 2023-12-11 ENCOUNTER — APPOINTMENT (OUTPATIENT)
Dept: CARDIOLOGY | Facility: CLINIC | Age: 33
End: 2023-12-11
Payer: MEDICARE

## 2023-12-11 VITALS
BODY MASS INDEX: 31.08 KG/M2 | DIASTOLIC BLOOD PRESSURE: 86 MMHG | HEIGHT: 67 IN | WEIGHT: 198 LBS | SYSTOLIC BLOOD PRESSURE: 127 MMHG

## 2023-12-11 DIAGNOSIS — I10 ESSENTIAL (PRIMARY) HYPERTENSION: ICD-10-CM

## 2023-12-11 PROCEDURE — 93000 ELECTROCARDIOGRAM COMPLETE: CPT

## 2023-12-11 PROCEDURE — 99212 OFFICE O/P EST SF 10 MIN: CPT | Mod: 25

## 2024-01-08 ENCOUNTER — RX RENEWAL (OUTPATIENT)
Age: 34
End: 2024-01-08

## 2024-01-08 RX ORDER — AMLODIPINE BESYLATE 10 MG/1
10 TABLET ORAL DAILY
Qty: 90 | Refills: 3 | Status: ACTIVE | COMMUNITY
Start: 2024-01-08 | End: 1900-01-01

## 2024-03-14 ENCOUNTER — APPOINTMENT (OUTPATIENT)
Age: 34
End: 2024-03-14
Payer: MEDICAID

## 2024-03-14 ENCOUNTER — OUTPATIENT (OUTPATIENT)
Dept: OUTPATIENT SERVICES | Facility: HOSPITAL | Age: 34
LOS: 1 days | End: 2024-03-14
Payer: MEDICARE

## 2024-03-14 PROCEDURE — D0120: CPT

## 2024-03-14 PROCEDURE — ZZZZZ: CPT

## 2024-03-14 PROCEDURE — D1110: CPT

## 2024-04-04 DIAGNOSIS — Z01.20 ENCOUNTER FOR DENTAL EXAMINATION AND CLEANING WITHOUT ABNORMAL FINDINGS: ICD-10-CM

## 2024-04-17 ENCOUNTER — OFFICE (OUTPATIENT)
Dept: URBAN - METROPOLITAN AREA CLINIC 70 | Facility: CLINIC | Age: 34
Setting detail: OPHTHALMOLOGY
End: 2024-04-17
Payer: MEDICARE

## 2024-04-17 DIAGNOSIS — H10.45: ICD-10-CM

## 2024-04-17 DIAGNOSIS — D31.32: ICD-10-CM

## 2024-04-17 DIAGNOSIS — H16.223: ICD-10-CM

## 2024-04-17 PROCEDURE — 92250 FUNDUS PHOTOGRAPHY W/I&R: CPT | Performed by: STUDENT IN AN ORGANIZED HEALTH CARE EDUCATION/TRAINING PROGRAM

## 2024-04-17 PROCEDURE — 92014 COMPRE OPH EXAM EST PT 1/>: CPT | Performed by: STUDENT IN AN ORGANIZED HEALTH CARE EDUCATION/TRAINING PROGRAM

## 2024-04-18 ENCOUNTER — RX RENEWAL (OUTPATIENT)
Age: 34
End: 2024-04-18

## 2024-04-18 RX ORDER — CLONIDINE HYDROCHLORIDE 0.1 MG/1
0.1 TABLET ORAL TWICE DAILY
Qty: 180 | Refills: 3 | Status: ACTIVE | COMMUNITY
Start: 2024-04-18 | End: 1900-01-01

## 2024-04-18 RX ORDER — HYDROCHLOROTHIAZIDE 25 MG/1
25 TABLET ORAL DAILY
Qty: 90 | Refills: 3 | Status: ACTIVE | COMMUNITY
Start: 2024-04-18 | End: 1900-01-01

## 2024-05-05 ENCOUNTER — INPATIENT (INPATIENT)
Facility: HOSPITAL | Age: 34
LOS: 1 days | Discharge: HOME | DRG: 916 | End: 2024-05-07
Attending: FAMILY MEDICINE | Admitting: FAMILY MEDICINE
Payer: MEDICARE

## 2024-05-05 VITALS
RESPIRATION RATE: 35 BRPM | OXYGEN SATURATION: 83 % | HEART RATE: 76 BPM | DIASTOLIC BLOOD PRESSURE: 78 MMHG | SYSTOLIC BLOOD PRESSURE: 162 MMHG | HEIGHT: 68 IN | WEIGHT: 184.97 LBS

## 2024-05-05 DIAGNOSIS — T78.2XXA ANAPHYLACTIC SHOCK, UNSPECIFIED, INITIAL ENCOUNTER: ICD-10-CM

## 2024-05-05 LAB
ALBUMIN SERPL ELPH-MCNC: 4.8 G/DL — SIGNIFICANT CHANGE UP (ref 3.3–5)
ALP SERPL-CCNC: 60 U/L — SIGNIFICANT CHANGE UP (ref 30–120)
ALT FLD-CCNC: 39 U/L — SIGNIFICANT CHANGE UP (ref 10–60)
ANION GAP SERPL CALC-SCNC: 16 MMOL/L — SIGNIFICANT CHANGE UP (ref 5–17)
AST SERPL-CCNC: 20 U/L — SIGNIFICANT CHANGE UP (ref 10–40)
BASE EXCESS BLDA CALC-SCNC: 5.1 MMOL/L — HIGH (ref -2–3)
BASE EXCESS BLDV CALC-SCNC: 4.2 MMOL/L — HIGH (ref -2–3)
BASOPHILS # BLD AUTO: 0 K/UL — SIGNIFICANT CHANGE UP (ref 0–0.2)
BASOPHILS NFR BLD AUTO: 0 % — SIGNIFICANT CHANGE UP (ref 0–2)
BILIRUB SERPL-MCNC: 0.3 MG/DL — SIGNIFICANT CHANGE UP (ref 0.2–1.2)
BUN SERPL-MCNC: 22 MG/DL — SIGNIFICANT CHANGE UP (ref 7–23)
CALCIUM SERPL-MCNC: 10.2 MG/DL — SIGNIFICANT CHANGE UP (ref 8.4–10.5)
CHLORIDE SERPL-SCNC: 101 MMOL/L — SIGNIFICANT CHANGE UP (ref 96–108)
CO2 SERPL-SCNC: 27 MMOL/L — SIGNIFICANT CHANGE UP (ref 22–31)
CREAT SERPL-MCNC: 1.73 MG/DL — HIGH (ref 0.5–1.3)
EGFR: 52 ML/MIN/1.73M2 — LOW
EOSINOPHIL # BLD AUTO: 0.81 K/UL — HIGH (ref 0–0.5)
EOSINOPHIL NFR BLD AUTO: 5 % — SIGNIFICANT CHANGE UP (ref 0–6)
GAS PNL BLDA: SIGNIFICANT CHANGE UP
GLUCOSE BLDC GLUCOMTR-MCNC: 130 MG/DL — HIGH (ref 70–99)
GLUCOSE SERPL-MCNC: 128 MG/DL — HIGH (ref 70–99)
HCO3 BLDA-SCNC: 30 MMOL/L — HIGH (ref 21–28)
HCO3 BLDV-SCNC: 32 MMOL/L — HIGH (ref 22–29)
HCT VFR BLD CALC: 48.9 % — SIGNIFICANT CHANGE UP (ref 39–50)
HGB BLD-MCNC: 15.6 G/DL — SIGNIFICANT CHANGE UP (ref 13–17)
HOROWITZ INDEX BLDA+IHG-RTO: 40 — SIGNIFICANT CHANGE UP
LG PLATELETS BLD QL AUTO: SLIGHT — SIGNIFICANT CHANGE UP
LYMPHOCYTES # BLD AUTO: 57 % — HIGH (ref 13–44)
LYMPHOCYTES # BLD AUTO: 9.24 K/UL — HIGH (ref 1–3.3)
LYMPHOCYTES # SPEC AUTO: 2 % — HIGH (ref 0–0)
MAGNESIUM SERPL-MCNC: 1.9 MG/DL — SIGNIFICANT CHANGE UP (ref 1.6–2.6)
MANUAL SMEAR VERIFICATION: SIGNIFICANT CHANGE UP
MCHC RBC-ENTMCNC: 29.1 PG — SIGNIFICANT CHANGE UP (ref 27–34)
MCHC RBC-ENTMCNC: 31.9 GM/DL — LOW (ref 32–36)
MCV RBC AUTO: 91.2 FL — SIGNIFICANT CHANGE UP (ref 80–100)
MONOCYTES # BLD AUTO: 1.62 K/UL — HIGH (ref 0–0.9)
MONOCYTES NFR BLD AUTO: 10 % — SIGNIFICANT CHANGE UP (ref 2–14)
NEUTROPHILS # BLD AUTO: 3.89 K/UL — SIGNIFICANT CHANGE UP (ref 1.8–7.4)
NEUTROPHILS NFR BLD AUTO: 24 % — LOW (ref 43–77)
NRBC # BLD: 0 /100 WBCS — SIGNIFICANT CHANGE UP (ref 0–0)
NRBC # BLD: SIGNIFICANT CHANGE UP /100 WBCS (ref 0–0)
PCO2 BLDA: 51 MMHG — HIGH (ref 35–48)
PCO2 BLDV: 76 MMHG — HIGH (ref 42–55)
PH BLDA: 7.38 — SIGNIFICANT CHANGE UP (ref 7.35–7.45)
PH BLDV: 7.23 — LOW (ref 7.32–7.43)
PHOSPHATE SERPL-MCNC: 6.6 MG/DL — HIGH (ref 2.5–4.5)
PLAT MORPH BLD: ABNORMAL
PLATELET # BLD AUTO: 324 K/UL — SIGNIFICANT CHANGE UP (ref 150–400)
PLATELET CLUMP BLD QL SMEAR: SLIGHT
PO2 BLDA: 112 MMHG — HIGH (ref 83–108)
PO2 BLDV: 103 MMHG — HIGH (ref 25–45)
POTASSIUM SERPL-MCNC: 4.2 MMOL/L — SIGNIFICANT CHANGE UP (ref 3.5–5.3)
POTASSIUM SERPL-SCNC: 4.2 MMOL/L — SIGNIFICANT CHANGE UP (ref 3.5–5.3)
PROT SERPL-MCNC: 8.8 G/DL — HIGH (ref 6–8.3)
RBC # BLD: 5.36 M/UL — SIGNIFICANT CHANGE UP (ref 4.2–5.8)
RBC # FLD: 12 % — SIGNIFICANT CHANGE UP (ref 10.3–14.5)
RBC BLD AUTO: NORMAL — SIGNIFICANT CHANGE UP
SAO2 % BLDA: 98.6 % — HIGH (ref 94–98)
SAO2 % BLDV: 98.2 % — HIGH (ref 67–88)
SODIUM SERPL-SCNC: 144 MMOL/L — SIGNIFICANT CHANGE UP (ref 135–145)
VARIANT LYMPHS # BLD: 2 % — SIGNIFICANT CHANGE UP (ref 0–6)
WBC # BLD: 16.21 K/UL — HIGH (ref 3.8–10.5)
WBC # FLD AUTO: 16.21 K/UL — HIGH (ref 3.8–10.5)

## 2024-05-05 PROCEDURE — 71045 X-RAY EXAM CHEST 1 VIEW: CPT | Mod: 26

## 2024-05-05 PROCEDURE — 93010 ELECTROCARDIOGRAM REPORT: CPT

## 2024-05-05 PROCEDURE — 99291 CRITICAL CARE FIRST HOUR: CPT | Mod: FS,25

## 2024-05-05 PROCEDURE — 99291 CRITICAL CARE FIRST HOUR: CPT

## 2024-05-05 PROCEDURE — 31500 INSERT EMERGENCY AIRWAY: CPT

## 2024-05-05 RX ORDER — ENOXAPARIN SODIUM 100 MG/ML
40 INJECTION SUBCUTANEOUS EVERY 24 HOURS
Refills: 0 | Status: DISCONTINUED | OUTPATIENT
Start: 2024-05-06 | End: 2024-05-07

## 2024-05-05 RX ORDER — AZELASTINE HCL 0.05 %
0 DROPS OPHTHALMIC (EYE)
Qty: 0 | Refills: 0 | DISCHARGE

## 2024-05-05 RX ORDER — LANOLIN ALCOHOL/MO/W.PET/CERES
3 CREAM (GRAM) TOPICAL AT BEDTIME
Refills: 0 | Status: DISCONTINUED | OUTPATIENT
Start: 2024-05-05 | End: 2024-05-07

## 2024-05-05 RX ORDER — CHLORHEXIDINE GLUCONATE 213 G/1000ML
15 SOLUTION TOPICAL EVERY 12 HOURS
Refills: 0 | Status: DISCONTINUED | OUTPATIENT
Start: 2024-05-05 | End: 2024-05-06

## 2024-05-05 RX ORDER — CETIRIZINE HYDROCHLORIDE 10 MG/1
1 TABLET ORAL
Qty: 0 | Refills: 0 | DISCHARGE

## 2024-05-05 RX ORDER — PROPOFOL 10 MG/ML
20 INJECTION, EMULSION INTRAVENOUS ONCE
Refills: 0 | Status: COMPLETED | OUTPATIENT
Start: 2024-05-05 | End: 2024-05-05

## 2024-05-05 RX ORDER — CETIRIZINE HYDROCHLORIDE 10 MG/1
1 TABLET ORAL
Refills: 0 | DISCHARGE

## 2024-05-05 RX ORDER — DIPHENHYDRAMINE HCL 50 MG
25 CAPSULE ORAL EVERY 6 HOURS
Refills: 0 | Status: COMPLETED | OUTPATIENT
Start: 2024-05-05 | End: 2024-05-06

## 2024-05-05 RX ORDER — QUETIAPINE FUMARATE 200 MG/1
0 TABLET, FILM COATED ORAL
Qty: 0 | Refills: 0 | DISCHARGE

## 2024-05-05 RX ORDER — CARVEDILOL PHOSPHATE 80 MG/1
1 CAPSULE, EXTENDED RELEASE ORAL
Qty: 0 | Refills: 0 | DISCHARGE

## 2024-05-05 RX ORDER — AMLODIPINE BESYLATE 2.5 MG/1
1 TABLET ORAL
Qty: 0 | Refills: 0 | DISCHARGE

## 2024-05-05 RX ORDER — DEXMEDETOMIDINE HYDROCHLORIDE IN 0.9% SODIUM CHLORIDE 4 UG/ML
0.2 INJECTION INTRAVENOUS
Qty: 200 | Refills: 0 | Status: DISCONTINUED | OUTPATIENT
Start: 2024-05-05 | End: 2024-05-06

## 2024-05-05 RX ORDER — TRIAMCINOLONE ACETONIDE 55 MCG
0 AEROSOL, SPRAY (GRAM) NASAL
Qty: 0 | Refills: 0 | DISCHARGE

## 2024-05-05 RX ORDER — LACOSAMIDE 50 MG/1
200 TABLET ORAL EVERY 12 HOURS
Refills: 0 | Status: DISCONTINUED | OUTPATIENT
Start: 2024-05-05 | End: 2024-05-06

## 2024-05-05 RX ORDER — FAMOTIDINE 10 MG/ML
20 INJECTION INTRAVENOUS
Refills: 0 | Status: DISCONTINUED | OUTPATIENT
Start: 2024-05-05 | End: 2024-05-07

## 2024-05-05 RX ORDER — HYDROCHLOROTHIAZIDE 25 MG
1 TABLET ORAL
Qty: 0 | Refills: 0 | DISCHARGE

## 2024-05-05 RX ORDER — ALBUTEROL 90 UG/1
2 AEROSOL, METERED ORAL EVERY 6 HOURS
Refills: 0 | Status: DISCONTINUED | OUTPATIENT
Start: 2024-05-05 | End: 2024-05-06

## 2024-05-05 RX ORDER — DIPHENHYDRAMINE HCL 50 MG
25 CAPSULE ORAL ONCE
Refills: 0 | Status: COMPLETED | OUTPATIENT
Start: 2024-05-05 | End: 2024-05-05

## 2024-05-05 RX ORDER — SODIUM CHLORIDE 9 MG/ML
1000 INJECTION INTRAMUSCULAR; INTRAVENOUS; SUBCUTANEOUS ONCE
Refills: 0 | Status: COMPLETED | OUTPATIENT
Start: 2024-05-05 | End: 2024-05-05

## 2024-05-05 RX ORDER — PROPOFOL 10 MG/ML
45 INJECTION, EMULSION INTRAVENOUS
Qty: 1000 | Refills: 0 | Status: DISCONTINUED | OUTPATIENT
Start: 2024-05-05 | End: 2024-05-05

## 2024-05-05 RX ORDER — OMEGA-3 ACID ETHYL ESTERS 1 G
0 CAPSULE ORAL
Qty: 0 | Refills: 0 | DISCHARGE

## 2024-05-05 RX ORDER — FENTANYL CITRATE 50 UG/ML
50 INJECTION INTRAVENOUS
Refills: 0 | Status: DISCONTINUED | OUTPATIENT
Start: 2024-05-05 | End: 2024-05-06

## 2024-05-05 RX ORDER — EPINEPHRINE 0.3 MG/.3ML
0.3 INJECTION INTRAMUSCULAR; SUBCUTANEOUS ONCE
Refills: 0 | Status: COMPLETED | OUTPATIENT
Start: 2024-05-05 | End: 2024-05-05

## 2024-05-05 RX ORDER — DOCUSATE SODIUM 100 MG
1 CAPSULE ORAL
Qty: 0 | Refills: 0 | DISCHARGE

## 2024-05-05 RX ORDER — ONDANSETRON 8 MG/1
4 TABLET, FILM COATED ORAL EVERY 8 HOURS
Refills: 0 | Status: DISCONTINUED | OUTPATIENT
Start: 2024-05-05 | End: 2024-05-07

## 2024-05-05 RX ORDER — ESLICARBAZEPINE ACETATE 800 MG/1
1 TABLET ORAL
Qty: 0 | Refills: 0 | DISCHARGE

## 2024-05-05 RX ORDER — MIDAZOLAM HYDROCHLORIDE 1 MG/ML
2 INJECTION, SOLUTION INTRAMUSCULAR; INTRAVENOUS ONCE
Refills: 0 | Status: DISCONTINUED | OUTPATIENT
Start: 2024-05-05 | End: 2024-05-05

## 2024-05-05 RX ORDER — SODIUM CHLORIDE 9 MG/ML
1000 INJECTION, SOLUTION INTRAVENOUS
Refills: 0 | Status: DISCONTINUED | OUTPATIENT
Start: 2024-05-05 | End: 2024-05-06

## 2024-05-05 RX ORDER — FAMOTIDINE 10 MG/ML
20 INJECTION INTRAVENOUS
Refills: 0 | Status: DISCONTINUED | OUTPATIENT
Start: 2024-05-05 | End: 2024-05-05

## 2024-05-05 RX ORDER — PROPOFOL 10 MG/ML
20 INJECTION, EMULSION INTRAVENOUS
Qty: 1000 | Refills: 0 | Status: DISCONTINUED | OUTPATIENT
Start: 2024-05-05 | End: 2024-05-06

## 2024-05-05 RX ORDER — LACOSAMIDE 50 MG/1
1 TABLET ORAL
Refills: 0 | DISCHARGE

## 2024-05-05 RX ORDER — HYDRALAZINE HCL 50 MG
10 TABLET ORAL EVERY 6 HOURS
Refills: 0 | Status: DISCONTINUED | OUTPATIENT
Start: 2024-05-05 | End: 2024-05-07

## 2024-05-05 RX ORDER — FENOFIBRATE,MICRONIZED 130 MG
0 CAPSULE ORAL
Qty: 0 | Refills: 0 | DISCHARGE

## 2024-05-05 RX ORDER — LACOSAMIDE 50 MG/1
1 TABLET ORAL
Qty: 0 | Refills: 0 | DISCHARGE

## 2024-05-05 RX ORDER — OMEGA-3 ACID ETHYL ESTERS 1 G
1 CAPSULE ORAL
Refills: 0 | DISCHARGE

## 2024-05-05 RX ORDER — LACOSAMIDE 50 MG/1
400 TABLET ORAL EVERY 12 HOURS
Refills: 0 | Status: DISCONTINUED | OUTPATIENT
Start: 2024-05-05 | End: 2024-05-05

## 2024-05-05 RX ORDER — ACETAMINOPHEN 500 MG
650 TABLET ORAL EVERY 6 HOURS
Refills: 0 | Status: DISCONTINUED | OUTPATIENT
Start: 2024-05-05 | End: 2024-05-07

## 2024-05-05 RX ORDER — ALFUZOSIN HYDROCHLORIDE 10 MG/1
1 TABLET, EXTENDED RELEASE ORAL
Qty: 0 | Refills: 0 | DISCHARGE

## 2024-05-05 RX ORDER — SIMVASTATIN 20 MG/1
1 TABLET, FILM COATED ORAL
Qty: 0 | Refills: 0 | DISCHARGE

## 2024-05-05 RX ORDER — MIDAZOLAM HYDROCHLORIDE 1 MG/ML
2 INJECTION, SOLUTION INTRAMUSCULAR; INTRAVENOUS
Refills: 0 | Status: DISCONTINUED | OUTPATIENT
Start: 2024-05-05 | End: 2024-05-05

## 2024-05-05 RX ADMIN — ENOXAPARIN SODIUM 40 MILLIGRAM(S): 100 INJECTION SUBCUTANEOUS at 23:47

## 2024-05-05 RX ADMIN — Medication 25 MILLIGRAM(S): at 23:48

## 2024-05-05 RX ADMIN — SODIUM CHLORIDE 120 MILLILITER(S): 9 INJECTION, SOLUTION INTRAVENOUS at 19:33

## 2024-05-05 RX ADMIN — MIDAZOLAM HYDROCHLORIDE 2 MILLIGRAM(S): 1 INJECTION, SOLUTION INTRAMUSCULAR; INTRAVENOUS at 19:38

## 2024-05-05 RX ADMIN — LACOSAMIDE 140 MILLIGRAM(S): 50 TABLET ORAL at 21:15

## 2024-05-05 RX ADMIN — Medication 40 MILLIGRAM(S): at 21:28

## 2024-05-05 RX ADMIN — ALBUTEROL 2 PUFF(S): 90 AEROSOL, METERED ORAL at 21:05

## 2024-05-05 RX ADMIN — PROPOFOL 22.7 MICROGRAM(S)/KG/MIN: 10 INJECTION, EMULSION INTRAVENOUS at 20:36

## 2024-05-05 RX ADMIN — Medication 25 MILLIGRAM(S): at 18:38

## 2024-05-05 RX ADMIN — Medication 25 MILLIGRAM(S): at 17:35

## 2024-05-05 RX ADMIN — Medication 2 MILLIGRAM(S): at 21:38

## 2024-05-05 RX ADMIN — SODIUM CHLORIDE 2000 MILLILITER(S): 9 INJECTION INTRAMUSCULAR; INTRAVENOUS; SUBCUTANEOUS at 18:26

## 2024-05-05 RX ADMIN — PROPOFOL 20 MILLIGRAM(S): 10 INJECTION, EMULSION INTRAVENOUS at 17:55

## 2024-05-05 RX ADMIN — DEXMEDETOMIDINE HYDROCHLORIDE IN 0.9% SODIUM CHLORIDE 4.2 MICROGRAM(S)/KG/HR: 4 INJECTION INTRAVENOUS at 18:48

## 2024-05-05 RX ADMIN — PROPOFOL 22.7 MICROGRAM(S)/KG/MIN: 10 INJECTION, EMULSION INTRAVENOUS at 18:12

## 2024-05-05 RX ADMIN — EPINEPHRINE 0.3 MILLIGRAM(S): 0.3 INJECTION INTRAMUSCULAR; SUBCUTANEOUS at 17:35

## 2024-05-05 RX ADMIN — MIDAZOLAM HYDROCHLORIDE 2 MILLIGRAM(S): 1 INJECTION, SOLUTION INTRAMUSCULAR; INTRAVENOUS at 18:07

## 2024-05-05 RX ADMIN — PROPOFOL 20 MILLIGRAM(S): 10 INJECTION, EMULSION INTRAVENOUS at 18:02

## 2024-05-05 RX ADMIN — DEXMEDETOMIDINE HYDROCHLORIDE IN 0.9% SODIUM CHLORIDE 4.2 MICROGRAM(S)/KG/HR: 4 INJECTION INTRAVENOUS at 20:22

## 2024-05-05 RX ADMIN — PROPOFOL 20 MILLIGRAM(S): 10 INJECTION, EMULSION INTRAVENOUS at 17:58

## 2024-05-05 RX ADMIN — FAMOTIDINE 20 MILLIGRAM(S): 10 INJECTION INTRAVENOUS at 19:32

## 2024-05-05 RX ADMIN — DEXMEDETOMIDINE HYDROCHLORIDE IN 0.9% SODIUM CHLORIDE 4.2 MICROGRAM(S)/KG/HR: 4 INJECTION INTRAVENOUS at 22:47

## 2024-05-05 RX ADMIN — Medication 125 MILLIGRAM(S): at 17:50

## 2024-05-05 NOTE — H&P ADULT - NSHPPOADEEPVENOUSTHROMB_GEN_A_CORE
Pt left message yesterday to update  that he has been diagnosed with early stage prostate cancer. Enma CAMARENA September 29, 2020, 8:42 AM         no

## 2024-05-05 NOTE — H&P ADULT - NSHPLABSRESULTS_GEN_ALL_CORE
Labs:                          15.6   16.21 )-----------( 324      ( 05 May 2024 17:52 )             48.9       05-05    144  |  101  |  22  ----------------------------<  128<H>  4.2   |  27  |  1.73<H>    Ca    10.2      05 May 2024 17:52  Phos  6.6     05-05  Mg     1.9     05-05    TPro  8.8<H>  /  Alb  4.8  /  TBili  0.3  /  DBili  x   /  AST  20  /  ALT  39  /  AlkPhos  60  05-05          ABG - ( 05 May 2024 21:15 )  pH, Arterial: 7.38  pH, Blood: x     /  pCO2: 51    /  pO2: 112   / HCO3: 30    / Base Excess: 5.1   /  SaO2: 98.6                Urinalysis Basic - ( 05 May 2024 17:52 )    Color: x / Appearance: x / SG: x / pH: x  Gluc: 128 mg/dL / Ketone: x  / Bili: x / Urobili: x   Blood: x / Protein: x / Nitrite: x   Leuk Esterase: x / RBC: x / WBC x   Sq Epi: x / Non Sq Epi: x / Bacteria: x            Lactate Trend            CAPILLARY BLOOD GLUCOSE      POCT Blood Glucose.: 130 mg/dL (05 May 2024 18:18)

## 2024-05-05 NOTE — ED PROVIDER NOTE - CLINICAL SUMMARY MEDICAL DECISION MAKING FREE TEXT BOX
33-year-old male with past medical history of mental disability and seizures presents with allergic reaction.  Per father patient was at a pet store and had acute onset of difficulty breathing.  Patient had similar episode in February where he was intubated at the time.  Unable to obtain further information due to patient being combative.    VSS Afebrile, in mod resp distress  HEENT - clear, no tongue or throat swelling, ??Stridor  PERRL EOMI  Neck supple  lungs diffuse exp wheezes  Cor S1S2 RR - MGR  Abd soft nontender, no mass or HSM, no rebound  Ext FROM intact, no edema  Neuro Intact, no deficits.  Skin Warm and dry no rash.    Imp- Anaphylactic reaction, acute resp failure  Plan - emergently intubated for ventilatory support. Epi, Benadryl, Steroids given.  CC consult called with Dr Melendez  Admit to SPCU.   Parents at bedside and in agreement with plan.

## 2024-05-05 NOTE — PROGRESS NOTE ADULT - ASSESSMENT
Patient is a 33-year-old male with past medical history of mental disability and seizures presents with allergic reaction, resp failure, ARF    Plan  Neuro: sedated on multiple infusions with propofol and Precedex actively titrating to RASs of -2. Also can receive PRN ativan and fentanyl to facilitate safe ventilation. Restart home AEDs  Cardio: HD stable, keep MAP above 65   Pulm: Intubated on full vent support, actively titrate to sp02 above 92%. Obtain post intubation ABG. SBT in morning as tolerated. Would preform cuff leak prior to extubation   GI: NPO.   Renal on LR at 120cc/hr for mild renal failure, strict i/o  Id: no infectious concerns at this time  Heme: start sq lovenox + SCDs  endo: on IV solumedrol ,benadryl and pepcid for allergic reaction    Dispo: Remains in SPCU, pt is full code, updated father at bedside this evening

## 2024-05-05 NOTE — ED ADULT NURSE NOTE - OBJECTIVE STATEMENT
patient ambulated into ED with father, c/o difficulty breathing that began in pet store P[place]. patient with hx of intellectual disability, combative upon arrival, brought to cast room for privacy and safety during assessment. patient given IM benadryl and epi, quickly noted to become apneic and unresponsive, intubated for airway protection.

## 2024-05-05 NOTE — PROGRESS NOTE ADULT - SUBJECTIVE AND OBJECTIVE BOX
Date of entry of this note is equal to the date of services rendered     Patient is a 33y old  Male who presents with a chief complaint of     BRIEF HOSPITAL COURSE:   Patient is a 33-year-old male with past medical history of mental disability and seizures presents with allergic reaction.  Per father patient was at a pet store and had acute onset of difficulty breathing.  Patient had similar episode in February where he was intubated at the time.  Unable to obtain further information due to patient being combative    Admitted to SPCU intubated       Events last 24 hours:   Remains on full vent support  obtain ABG      Social history:    PAST MEDICAL & SURGICAL HISTORY:  MR (mental retardation), moderate      ADHD      Seizure disorder      Corpus callosum agenesis      Hypertension      CKD (chronic kidney disease), stage III      Hyperlipidemia      Seasonal allergies      Calculus of kidney      History of BPH      H/O constipation      Allergic conjunctivitis      H/O allergic rhinitis      No significant past surgical history        Allergies    Tegretol (Anaphylaxis)    Intolerances      FAMILY HISTORY:      Review of Systems:  pt intubated unable to obtain     Medications:      albuterol    90 MICROgram(s) HFA Inhaler 2 Puff(s) Inhalation every 6 hours  diphenhydrAMINE Injectable 25 milliGRAM(s) IV Push every 6 hours    acetaminophen     Tablet .. 650 milliGRAM(s) Oral every 6 hours PRN  dexMEDEtomidine Infusion 0.2 MICROgram(s)/kG/Hr IV Continuous <Continuous>  fentaNYL    Injectable 50 MICROGram(s) IV Push every 30 minutes PRN  lacosamide IVPB 200 milliGRAM(s) IV Intermittent every 12 hours  LORazepam   Injectable 2 milliGRAM(s) IV Push every 1 hour PRN  melatonin 3 milliGRAM(s) Oral at bedtime PRN  ondansetron Injectable 4 milliGRAM(s) IV Push every 8 hours PRN  propofol Infusion 45 MICROgram(s)/kG/Min IV Continuous <Continuous>        aluminum hydroxide/magnesium hydroxide/simethicone Suspension 30 milliLiter(s) Oral every 4 hours PRN  famotidine Injectable 20 milliGRAM(s) IV Push two times a day      methylPREDNISolone sodium succinate Injectable 40 milliGRAM(s) IV Push every 8 hours    lactated ringers. 1000 milliLiter(s) IV Continuous <Continuous>      chlorhexidine 0.12% Liquid 15 milliLiter(s) Oral Mucosa every 12 hours        Mode: AC/ CMV (Assist Control/ Continuous Mandatory Ventilation)  RR (machine): 16  TV (machine): 450  FiO2: 40  PEEP: 5  ITime: 1  MAP: 7  PIP: 13      ICU Vital Signs Last 24 Hrs  T(C): 35.8 (05 May 2024 20:15), Max: 35.8 (05 May 2024 20:15)  T(F): 96.4 (05 May 2024 20:15), Max: 96.4 (05 May 2024 20:15)  HR: 57 (05 May 2024 20:15) (57 - 96)  BP: 139/93 (05 May 2024 20:15) (114/71 - 162/78)  BP(mean): 107 (05 May 2024 20:15) (87 - 107)  ABP: --  ABP(mean): --  RR: 16 (05 May 2024 20:15) (16 - 35)  SpO2: 97% (05 May 2024 20:15) (83% - 100%)    O2 Parameters below as of 05 May 2024 20:15  Patient On (Oxygen Delivery Method): ventilator    O2 Concentration (%): 40      Vital Signs Last 24 Hrs  T(C): 35.8 (05 May 2024 20:15), Max: 35.8 (05 May 2024 20:15)  T(F): 96.4 (05 May 2024 20:15), Max: 96.4 (05 May 2024 20:15)  HR: 57 (05 May 2024 20:15) (57 - 96)  BP: 139/93 (05 May 2024 20:15) (114/71 - 162/78)  BP(mean): 107 (05 May 2024 20:15) (87 - 107)  RR: 16 (05 May 2024 20:15) (16 - 35)  SpO2: 97% (05 May 2024 20:15) (83% - 100%)    Parameters below as of 05 May 2024 20:15  Patient On (Oxygen Delivery Method): ventilator    O2 Concentration (%): 40        I&O's Detail        LABS:                        15.6   16.21 )-----------( 324      ( 05 May 2024 17:52 )             48.9     05-05    144  |  101  |  22  ----------------------------<  128<H>  4.2   |  27  |  1.73<H>    Ca    10.2      05 May 2024 17:52  Phos  6.6     05-05  Mg     1.9     05-05    TPro  8.8<H>  /  Alb  4.8  /  TBili  0.3  /  DBili  x   /  AST  20  /  ALT  39  /  AlkPhos  60  05-05          CAPILLARY BLOOD GLUCOSE      POCT Blood Glucose.: 130 mg/dL (05 May 2024 18:18)      Urinalysis Basic - ( 05 May 2024 17:52 )    Color: x / Appearance: x / SG: x / pH: x  Gluc: 128 mg/dL / Ketone: x  / Bili: x / Urobili: x   Blood: x / Protein: x / Nitrite: x   Leuk Esterase: x / RBC: x / WBC x   Sq Epi: x / Non Sq Epi: x / Bacteria: x      CULTURES:      Physical Examination:    General: adult male in bed, intubated on vent    HEENT: Pupils equal, reactive to light.  Symmetric.    PULM: b/l air entry     CVS: +s1/s2    ABD: Soft    EXT: No edema,    SKIN: Warm     Neuro: Sedated     RADIOLOGY:   cxr clear     Critical Care time: 35 mins assessing presenting problems of acute illness that poses high probability of life threatening deterioration or end organ damage/dysfunction.  Medical decision making inculding Initiating plan of care, reviewing data, reviewing radiology,direct patient bedside evaluation and interpretation of vital signs, any necessary ventilator management , discusion with multidisciplinary team, discussing goals of care with patient/family, all non inclusive of procedures

## 2024-05-05 NOTE — H&P ADULT - ASSESSMENT
Anaphylactic shock  -intubated in ED, on propofol and precedex and restraints  -vitals appear to have stabilized, monitor in SPCU  -maintenance LR  -will plan to monitor ABGs and wean off ventilator as tolerated  -amanda placed for urine output monitoring  -will need training for Epipen and allergy outpatient followup      Seizure disorder  -hold home seroquel     Anaphylactic shock  -intubated in ED, on propofol and precedex and restraints  -vitals appear to have stabilized, monitor in SPCU  -maintenance LR  -will plan to monitor ABGs and wean off ventilator as tolerated  -amanda placed for urine output monitoring  -will need training for Epipen and allergy outpatient followup      Seizure disorder  -hold home seroquel  -home vimpat switched to IV         Anaphylactic shock  -intubated in ED, on propofol and precedex and restraints  -vitals appear to have stabilized, monitor in SPCU  -maintenance LR  -will plan to monitor ABGs and wean off ventilator as tolerated  -amanda placed for urine output monitoring  -will need training for Epipen and allergy outpatient followup given reported multiple hospitalizations this year    Seizure disorder, intellectual disability  -home vimpat switched to IV  -hold home seroquel while NPO    HTN  -hold home clonidine, HCTZ, amlodipine, carvedilol while NPO  -monitor BP, will consider IV labetalol if SBP>180    HLD  -hold home simvastatin, fenofibrate, Omega-3 while NPO

## 2024-05-05 NOTE — ED PROVIDER NOTE - CARE PLAN
Principal Discharge DX:	Anaphylactic shock  Secondary Diagnosis:	Acute respiratory failure with hypoxia   1

## 2024-05-05 NOTE — ED PROVIDER NOTE - PROGRESS NOTE DETAILS
pt agitated in ed combative  will attempt to givew IM EPI, IM BENADRYL AND DECADROM AND REEVAL PT BECAME UNRESPONSIVE AND CYANOTIC. SAT 80% ON NONREBREATHER. PT INTUBATED SUCCESSFULLY BY MD, dr gerber will kindly admit dr clayton will kindly see pt in ed pt agitated in ed combative  will attempt to give IM EPI, IM BENADRYL AND DECADRON AND REEVAL

## 2024-05-05 NOTE — H&P ADULT - NSHPPHYSICALEXAM_GEN_ALL_CORE
PHYSICAL EXAM:  GENERAL: Adult male, stuporous, well-groomed, well-developed  HEAD:  Atraumatic, Normocephalic  ENMT: Moist mucous membranes  NECK: No JVD appreciated  NERVOUS SYSTEM:  unresponsive to voice or light touch  CHEST/LUNG: Intubated, clear lung sounds in all fields, faint scattered wheezing appreciated in anterior fields  HEART: Regular rate and rhythm, S1 and S2 present. No murmurs appreciated. No limb edema appreciated  ABDOMEN:  Soft. Nondistended, no palpable masses appreciated  EXTREMITIES: In bilateral wrist restraints. No clubbing, cyanosis, or edema appreciated  LYMPH: No lymphadenopathy noted  SKIN: No rashes or lesions appreciated

## 2024-05-05 NOTE — ED PROVIDER NOTE - OBJECTIVE STATEMENT
Patient is a 33-year-old male with past medical history of mental disability and seizures presents with allergic reaction.  Per father patient was at a pet store and had acute onset of difficulty breathing.  Patient had similar episode in February where he was intubated at the time.  Unable to obtain further information due to patient being combative.

## 2024-05-05 NOTE — ED ADULT NURSE NOTE - ISOLATION TYPE:
None
Implemented All Fall Risk Interventions:  [place] to call system. Call bell, personal items and telephone within reach. Instruct patient to call for assistance. Room bathroom lighting operational. Non-slip footwear when patient is off stretcher. Physically safe environment: no spills, clutter or unnecessary equipment. Stretcher in lowest position, wheels locked, appropriate side rails in place. Provide visual cue, wrist band, yellow gown, etc. Monitor gait and stability. Monitor for mental status changes and reorient to person, place, and time. Review medications for side effects contributing to fall risk. Reinforce activity limits and safety measures with patient and family.

## 2024-05-05 NOTE — PATIENT PROFILE ADULT - STATED REASON FOR ADMISSION
pt choking  at pet store. Dad brought him in ED. Became restless and aggitated, violent. EPi aand benedryl given. Pt became unresponsive and got intubated.

## 2024-05-06 ENCOUNTER — RX RENEWAL (OUTPATIENT)
Age: 34
End: 2024-05-06

## 2024-05-06 LAB
ALBUMIN SERPL ELPH-MCNC: 4 G/DL — SIGNIFICANT CHANGE UP (ref 3.3–5)
ALP SERPL-CCNC: 40 U/L — SIGNIFICANT CHANGE UP (ref 30–120)
ALT FLD-CCNC: 34 U/L — SIGNIFICANT CHANGE UP (ref 10–60)
ANION GAP SERPL CALC-SCNC: 11 MMOL/L — SIGNIFICANT CHANGE UP (ref 5–17)
AST SERPL-CCNC: 19 U/L — SIGNIFICANT CHANGE UP (ref 10–40)
BASE EXCESS BLDA CALC-SCNC: 5.6 MMOL/L — HIGH (ref -2–3)
BILIRUB SERPL-MCNC: 0.3 MG/DL — SIGNIFICANT CHANGE UP (ref 0.2–1.2)
BUN SERPL-MCNC: 18 MG/DL — SIGNIFICANT CHANGE UP (ref 7–23)
CALCIUM SERPL-MCNC: 9.2 MG/DL — SIGNIFICANT CHANGE UP (ref 8.4–10.5)
CHLORIDE SERPL-SCNC: 106 MMOL/L — SIGNIFICANT CHANGE UP (ref 96–108)
CO2 SERPL-SCNC: 26 MMOL/L — SIGNIFICANT CHANGE UP (ref 22–31)
CREAT SERPL-MCNC: 1.29 MG/DL — SIGNIFICANT CHANGE UP (ref 0.5–1.3)
EGFR: 75 ML/MIN/1.73M2 — SIGNIFICANT CHANGE UP
GLUCOSE SERPL-MCNC: 144 MG/DL — HIGH (ref 70–99)
HCO3 BLDA-SCNC: 30 MMOL/L — HIGH (ref 21–28)
HCT VFR BLD CALC: 42.7 % — SIGNIFICANT CHANGE UP (ref 39–50)
HGB BLD-MCNC: 14.9 G/DL — SIGNIFICANT CHANGE UP (ref 13–17)
HOROWITZ INDEX BLDA+IHG-RTO: 40 — SIGNIFICANT CHANGE UP
MAGNESIUM SERPL-MCNC: 1.5 MG/DL — LOW (ref 1.6–2.6)
MCHC RBC-ENTMCNC: 30 PG — SIGNIFICANT CHANGE UP (ref 27–34)
MCHC RBC-ENTMCNC: 34.9 GM/DL — SIGNIFICANT CHANGE UP (ref 32–36)
MCV RBC AUTO: 85.9 FL — SIGNIFICANT CHANGE UP (ref 80–100)
NRBC # BLD: 0 /100 WBCS — SIGNIFICANT CHANGE UP (ref 0–0)
PCO2 BLDA: 49 MMHG — HIGH (ref 35–48)
PH BLDA: 7.4 — SIGNIFICANT CHANGE UP (ref 7.35–7.45)
PHOSPHATE SERPL-MCNC: 2.9 MG/DL — SIGNIFICANT CHANGE UP (ref 2.5–4.5)
PLATELET # BLD AUTO: 266 K/UL — SIGNIFICANT CHANGE UP (ref 150–400)
PO2 BLDA: 125 MMHG — HIGH (ref 83–108)
POTASSIUM SERPL-MCNC: 3.7 MMOL/L — SIGNIFICANT CHANGE UP (ref 3.5–5.3)
POTASSIUM SERPL-SCNC: 3.7 MMOL/L — SIGNIFICANT CHANGE UP (ref 3.5–5.3)
PROT SERPL-MCNC: 7.8 G/DL — SIGNIFICANT CHANGE UP (ref 6–8.3)
RBC # BLD: 4.97 M/UL — SIGNIFICANT CHANGE UP (ref 4.2–5.8)
RBC # FLD: 11.8 % — SIGNIFICANT CHANGE UP (ref 10.3–14.5)
SAO2 % BLDA: 99.2 % — HIGH (ref 94–98)
SODIUM SERPL-SCNC: 143 MMOL/L — SIGNIFICANT CHANGE UP (ref 135–145)
T3 SERPL-MCNC: 64 NG/DL — LOW (ref 80–200)
T4 AB SER-ACNC: 2.8 UG/DL — LOW (ref 4.6–12)
TSH SERPL-MCNC: 5.27 UIU/ML — HIGH (ref 0.27–4.2)
WBC # BLD: 10.51 K/UL — HIGH (ref 3.8–10.5)
WBC # FLD AUTO: 10.51 K/UL — HIGH (ref 3.8–10.5)

## 2024-05-06 PROCEDURE — 99291 CRITICAL CARE FIRST HOUR: CPT

## 2024-05-06 RX ORDER — AMLODIPINE BESYLATE 2.5 MG/1
10 TABLET ORAL AT BEDTIME
Refills: 0 | Status: DISCONTINUED | OUTPATIENT
Start: 2024-05-06 | End: 2024-05-07

## 2024-05-06 RX ORDER — QUETIAPINE FUMARATE 200 MG/1
200 TABLET, FILM COATED ORAL THREE TIMES A DAY
Refills: 0 | Status: DISCONTINUED | OUTPATIENT
Start: 2024-05-06 | End: 2024-05-07

## 2024-05-06 RX ORDER — MAGNESIUM SULFATE 500 MG/ML
1 VIAL (ML) INJECTION ONCE
Refills: 0 | Status: COMPLETED | OUTPATIENT
Start: 2024-05-06 | End: 2024-05-06

## 2024-05-06 RX ORDER — LACOSAMIDE 50 MG/1
200 TABLET ORAL
Refills: 0 | Status: DISCONTINUED | OUTPATIENT
Start: 2024-05-06 | End: 2024-05-07

## 2024-05-06 RX ORDER — ESLICARBAZEPINE ACETATE 800 MG/1
800 TABLET ORAL DAILY
Refills: 0 | Status: DISCONTINUED | OUTPATIENT
Start: 2024-05-06 | End: 2024-05-07

## 2024-05-06 RX ORDER — TAMSULOSIN HYDROCHLORIDE 0.4 MG/1
0.8 CAPSULE ORAL AT BEDTIME
Refills: 0 | Status: DISCONTINUED | OUTPATIENT
Start: 2024-05-06 | End: 2024-05-07

## 2024-05-06 RX ORDER — CARVEDILOL 25 MG/1
25 TABLET, FILM COATED ORAL
Qty: 180 | Refills: 3 | Status: ACTIVE | COMMUNITY
Start: 2024-05-06 | End: 1900-01-01

## 2024-05-06 RX ORDER — SIMVASTATIN 20 MG/1
10 TABLET, FILM COATED ORAL AT BEDTIME
Refills: 0 | Status: DISCONTINUED | OUTPATIENT
Start: 2024-05-06 | End: 2024-05-07

## 2024-05-06 RX ADMIN — Medication 25 MILLIGRAM(S): at 05:37

## 2024-05-06 RX ADMIN — ALBUTEROL 2 PUFF(S): 90 AEROSOL, METERED ORAL at 00:40

## 2024-05-06 RX ADMIN — SODIUM CHLORIDE 120 MILLILITER(S): 9 INJECTION, SOLUTION INTRAVENOUS at 01:53

## 2024-05-06 RX ADMIN — FAMOTIDINE 20 MILLIGRAM(S): 10 INJECTION INTRAVENOUS at 05:38

## 2024-05-06 RX ADMIN — CHLORHEXIDINE GLUCONATE 15 MILLILITER(S): 213 SOLUTION TOPICAL at 05:37

## 2024-05-06 RX ADMIN — Medication 40 MILLIGRAM(S): at 21:28

## 2024-05-06 RX ADMIN — TAMSULOSIN HYDROCHLORIDE 0.8 MILLIGRAM(S): 0.4 CAPSULE ORAL at 21:30

## 2024-05-06 RX ADMIN — PROPOFOL 22.7 MICROGRAM(S)/KG/MIN: 10 INJECTION, EMULSION INTRAVENOUS at 05:37

## 2024-05-06 RX ADMIN — DEXMEDETOMIDINE HYDROCHLORIDE IN 0.9% SODIUM CHLORIDE 4.2 MICROGRAM(S)/KG/HR: 4 INJECTION INTRAVENOUS at 03:50

## 2024-05-06 RX ADMIN — ALBUTEROL 2 PUFF(S): 90 AEROSOL, METERED ORAL at 08:08

## 2024-05-06 RX ADMIN — AMLODIPINE BESYLATE 10 MILLIGRAM(S): 2.5 TABLET ORAL at 21:30

## 2024-05-06 RX ADMIN — Medication 25 MILLIGRAM(S): at 18:10

## 2024-05-06 RX ADMIN — QUETIAPINE FUMARATE 200 MILLIGRAM(S): 200 TABLET, FILM COATED ORAL at 14:19

## 2024-05-06 RX ADMIN — ENOXAPARIN SODIUM 40 MILLIGRAM(S): 100 INJECTION SUBCUTANEOUS at 21:34

## 2024-05-06 RX ADMIN — LACOSAMIDE 200 MILLIGRAM(S): 50 TABLET ORAL at 21:29

## 2024-05-06 RX ADMIN — QUETIAPINE FUMARATE 200 MILLIGRAM(S): 200 TABLET, FILM COATED ORAL at 21:30

## 2024-05-06 RX ADMIN — DEXMEDETOMIDINE HYDROCHLORIDE IN 0.9% SODIUM CHLORIDE 4.2 MICROGRAM(S)/KG/HR: 4 INJECTION INTRAVENOUS at 00:45

## 2024-05-06 RX ADMIN — Medication 40 MILLIGRAM(S): at 05:38

## 2024-05-06 RX ADMIN — ESLICARBAZEPINE ACETATE 800 MILLIGRAM(S): 800 TABLET ORAL at 12:21

## 2024-05-06 RX ADMIN — SIMVASTATIN 10 MILLIGRAM(S): 20 TABLET, FILM COATED ORAL at 21:30

## 2024-05-06 RX ADMIN — FAMOTIDINE 20 MILLIGRAM(S): 10 INJECTION INTRAVENOUS at 18:10

## 2024-05-06 RX ADMIN — Medication 100 GRAM(S): at 12:21

## 2024-05-06 RX ADMIN — PROPOFOL 22.7 MICROGRAM(S)/KG/MIN: 10 INJECTION, EMULSION INTRAVENOUS at 01:43

## 2024-05-06 RX ADMIN — Medication 25 MILLIGRAM(S): at 12:20

## 2024-05-06 RX ADMIN — Medication 40 MILLIGRAM(S): at 14:19

## 2024-05-06 RX ADMIN — LACOSAMIDE 140 MILLIGRAM(S): 50 TABLET ORAL at 08:53

## 2024-05-06 RX ADMIN — Medication 650 MILLIGRAM(S): at 23:56

## 2024-05-06 RX ADMIN — DEXMEDETOMIDINE HYDROCHLORIDE IN 0.9% SODIUM CHLORIDE 4.2 MICROGRAM(S)/KG/HR: 4 INJECTION INTRAVENOUS at 07:00

## 2024-05-06 NOTE — SWALLOW BEDSIDE ASSESSMENT ADULT - ASR SWALLOW RECOMMEND DIAG
Due to reported hx of choking, consider repeat MBS.  Discussed with Dr. Ruiz, per MD, MBS not warranted at this time.

## 2024-05-06 NOTE — CARE COORDINATION ASSESSMENT. - NSDCPLANREVIEW_GEN_ALL_CORE
PCP's office returned phone call and spoke with Sasha (RAER). They were not aware pt was rescheduled for surgery. They stated last office visit note is from 11/14/2019 and they will fax it to our office.    Family

## 2024-05-06 NOTE — CONSULT NOTE ADULT - CONSULT REASON
ADRIANO
Acute respiratory failure
anaphylactoid rx  resp failure  mrdd  asymmetric septal hypertrophy

## 2024-05-06 NOTE — SWALLOW BEDSIDE ASSESSMENT ADULT - SLP GENERAL OBSERVATIONS
Pt received upright in bed A&A, reduced cognition, on RA, SpO2 95-96%, father at bedside, pain scale 0/10 pre & post eval.

## 2024-05-06 NOTE — PROGRESS NOTE ADULT - ASSESSMENT
33-year-old male with past medical history of mental disability and seizures presents with allergic reaction.  Per father patient was at a pet store and had acute onset of difficulty breathing.     resp failure  mrdd  alyssa  obesity  seizures  ? anaphylactoid rxn    assess for SBT this am   vs noted  labs reviewed  Blood gas shows improvement    IVF  I and O  renal and cardio and neuro eval  oral hygiene  skin care  suction PRN  H1 and H2 blockers   systemic steroids  s/p Epi - in the ED  pulse ox - card tele monitoring  records from [facility] and [facility] reviewed  spoke with parents  Blood gas  SP[facility] care and admission  serum tryptase level  TFT

## 2024-05-06 NOTE — SOCIAL WORK PROGRESS NOTE - NSSWPROGRESSNOTE_GEN_ALL_CORE
Pt is from [facility].  Case discussed with treatment team this morning.  Pt came in for allergic reaction and had to intubated.  He is now extubated and doing well.  Father at bedside.  Plan is for discharge 5/7 back to [facility].  SW spoke to  at [facility] and made her aware.  She is agreeable.  Sw to follow for needs and support.

## 2024-05-06 NOTE — CARE COORDINATION ASSESSMENT. - NSPASTMEDSURGHISTORY_GEN_ALL_CORE_FT
PAST MEDICAL & SURGICAL HISTORY:  Calculus of kidney      Seasonal allergies      Hyperlipidemia      CKD (chronic kidney disease), stage III      Hypertension      Corpus callosum agenesis      Seizure disorder      ADHD      MR (mental retardation), moderate      H/O allergic rhinitis      Allergic conjunctivitis      H/O constipation      History of BPH      No significant past surgical history

## 2024-05-06 NOTE — PROGRESS NOTE ADULT - SUBJECTIVE AND OBJECTIVE BOX
Date/Time Patient Seen:  		  Referring MD:   Data Reviewed	       Patient is a 33y old  Male who presents with a chief complaint of     Subjective/HPI     PAST MEDICAL & SURGICAL HISTORY:  MR (mental retardation), moderate    ADHD    Seizure disorder    Corpus callosum agenesis    Hypertension    CKD (chronic kidney disease), stage III    Hyperlipidemia    Seasonal allergies    Calculus of kidney    History of BPH    H/O constipation    Allergic conjunctivitis    H/O allergic rhinitis    No significant past surgical history    No significant past surgical history          Medication list         MEDICATIONS  (STANDING):  albuterol    90 MICROgram(s) HFA Inhaler 2 Puff(s) Inhalation every 6 hours  chlorhexidine 0.12% Liquid 15 milliLiter(s) Oral Mucosa every 12 hours  dexMEDEtomidine Infusion 0.2 MICROgram(s)/kG/Hr (4.2 mL/Hr) IV Continuous <Continuous>  diphenhydrAMINE Injectable 25 milliGRAM(s) IV Push every 6 hours  enoxaparin Injectable 40 milliGRAM(s) SubCutaneous every 24 hours  famotidine Injectable 20 milliGRAM(s) IV Push two times a day  lacosamide IVPB 200 milliGRAM(s) IV Intermittent every 12 hours  lactated ringers. 1000 milliLiter(s) (120 mL/Hr) IV Continuous <Continuous>  methylPREDNISolone sodium succinate Injectable 40 milliGRAM(s) IV Push every 8 hours  propofol Infusion 45 MICROgram(s)/kG/Min (22.7 mL/Hr) IV Continuous <Continuous>    MEDICATIONS  (PRN):  acetaminophen     Tablet .. 650 milliGRAM(s) Oral every 6 hours PRN Temp greater or equal to 38C (100.4F), Mild Pain (1 - 3)  aluminum hydroxide/magnesium hydroxide/simethicone Suspension 30 milliLiter(s) Oral every 4 hours PRN Dyspepsia  fentaNYL    Injectable 50 MICROGram(s) IV Push every 30 minutes PRN sedation - pain -  hydrALAZINE Injectable 10 milliGRAM(s) IV Push every 6 hours PRN   LORazepam   Injectable 2 milliGRAM(s) IV Push every 1 hour PRN Agitation  melatonin 3 milliGRAM(s) Oral at bedtime PRN Insomnia  ondansetron Injectable 4 milliGRAM(s) IV Push every 8 hours PRN Nausea and/or Vomiting         Vitals log        ICU Vital Signs Last 24 Hrs  T(C): 36.5 (06 May 2024 06:00), Max: 36.5 (06 May 2024 06:00)  T(F): 97.7 (06 May 2024 06:00), Max: 97.7 (06 May 2024 06:00)  HR: 63 (06 May 2024 06:00) (53 - 96)  BP: 148/99 (06 May 2024 06:00) (114/71 - 162/78)  BP(mean): 114 (06 May 2024 06:00) (87 - 119)  ABP: --  ABP(mean): --  RR: 16 (06 May 2024 06:00) (15 - 35)  SpO2: 98% (06 May 2024 06:00) (83% - 100%)    O2 Parameters below as of 06 May 2024 06:00  Patient On (Oxygen Delivery Method): ventilator    O2 Concentration (%): 40         Mode: AC/ CMV (Assist Control/ Continuous Mandatory Ventilation)  RR (machine): 16  TV (machine): 450  FiO2: 40  PEEP: 5  ITime: 1  MAP: 7  PIP: 16      Input and Output:  I&O's Detail    05 May 2024 07:01  -  06 May 2024 06:13  --------------------------------------------------------  IN:    Dexmedetomidine: 228 mL    IV PiggyBack: 50 mL    Lactated Ringers: 1440 mL    Propofol: 286.4 mL  Total IN: 2004.4 mL    OUT:    Indwelling Catheter - Urethral (mL): 2000 mL  Total OUT: 2000 mL    Total NET: 4.4 mL          Lab Data                        15.6   16.21 )-----------( 324      ( 05 May 2024 17:52 )             48.9     05-05    144  |  101  |  22  ----------------------------<  128<H>  4.2   |  27  |  1.73<H>    Ca    10.2      05 May 2024 17:52  Phos  6.6     05-05  Mg     1.9     05-05    TPro  8.8<H>  /  Alb  4.8  /  TBili  0.3  /  DBili  x   /  AST  20  /  ALT  39  /  AlkPhos  60  05-05    ABG - ( 06 May 2024 06:00 )  pH, Arterial: 7.40  pH, Blood: x     /  pCO2: 49    /  pO2: 125   / HCO3: 30    / Base Excess: 5.6   /  SaO2: 99.2                    Review of Systems	      Objective     Physical Examination    heart s1s2  lung dec BS  head nc  head at      Pertinent Lab findings & Imaging      Peggy:  NO   Adequate UO     I&O's Detail    05 May 2024 07:01  -  06 May 2024 06:13  --------------------------------------------------------  IN:    Dexmedetomidine: 228 mL    IV PiggyBack: 50 mL    Lactated Ringers: 1440 mL    Propofol: 286.4 mL  Total IN: 2004.4 mL    OUT:    Indwelling Catheter - Urethral (mL): 2000 mL  Total OUT: 2000 mL    Total NET: 4.4 mL               Discussed with:     Cultures:	        Radiology

## 2024-05-06 NOTE — PROGRESS NOTE ADULT - SUBJECTIVE AND OBJECTIVE BOX
Date of Service is equal to the date of entry    24 hour events:     Review of Systems:  Constitutional: No fever, chills, fatigue  Neuro: No headache, numbness, weakness  Resp: No cough, wheezing, shortness of breath  CVS: No chest pain, palpitations, leg swelling  GI: No abdominal pain, nausea, vomiting, diarrhea   : No dysuria, frequency, incontinence  Skin: No itching, burning, rashes, or lesions   Msk: No joint pain or swelling  Psych: No depression, anxiety, mood swings    T(F): 98.4 (05-06-24 @ 20:44), Max: 98.4 (05-06-24 @ 09:00)  HR: 94 (05-06-24 @ 20:00) (53 - 96)  BP: 126/78 (05-06-24 @ 20:00) (123/81 - 155/97)  RR: 18 (05-06-24 @ 20:00) (12 - 18)  SpO2: 96% (05-06-24 @ 20:00) (94% - 100%)  Wt(kg): --    Mode: CPAP with PS, FiO2: 30, PEEP: 5, PS: 10  05-05-24 @ 07:01  -  05-06-24 @ 07:00  --------------------------------------------------------  IN: 1982.6 mL / OUT: 2000 mL / NET: -17.4 mL    05-06-24 @ 07:01  -  05-06-24 @ 21:20  --------------------------------------------------------  IN: 595 mL / OUT: 2500 mL / NET: -1905 mL        CAPILLARY BLOOD GLUCOSE      POCT Blood Glucose.: 130 mg/dL (05 May 2024 18:18)      I&O's Summary    05 May 2024 07:01  -  06 May 2024 07:00  --------------------------------------------------------  IN: 1982.6 mL / OUT: 2000 mL / NET: -17.4 mL    06 May 2024 07:01  -  06 May 2024 21:20  --------------------------------------------------------  IN: 595 mL / OUT: 2500 mL / NET: -1905 mL        Physical Exam:   Gen:  Neuro:  HEENT:  Resp:  CVS:  Abd:  Ext:  Skin:    Meds:    amLODIPine   Tablet Oral  hydrALAZINE Injectable IV Push PRN    methylPREDNISolone sodium succinate Injectable IV Push  simvastatin Oral      acetaminophen     Tablet .. Oral PRN  eslicarbazepine Oral  lacosamide Oral  LORazepam   Injectable IV Push PRN  melatonin Oral PRN  ondansetron Injectable IV Push PRN  QUEtiapine Oral      enoxaparin Injectable SubCutaneous    aluminum hydroxide/magnesium hydroxide/simethicone Suspension Oral PRN  famotidine Injectable IV Push    tamsulosin Oral                                    14.9   10.51 )-----------( 266      ( 06 May 2024 05:55 )             42.7       05-06    143  |  106  |  18  ----------------------------<  144<H>  3.7   |  26  |  1.29    Ca    9.2      06 May 2024 05:55  Phos  2.9     05-06  Mg     1.5     05-06    TPro  7.8  /  Alb  4.0  /  TBili  0.3  /  DBili  x   /  AST  19  /  ALT  34  /  AlkPhos  40  05-06            Urinalysis Basic - ( 06 May 2024 05:55 )    Color: x / Appearance: x / SG: x / pH: x  Gluc: 144 mg/dL / Ketone: x  / Bili: x / Urobili: x   Blood: x / Protein: x / Nitrite: x   Leuk Esterase: x / RBC: x / WBC x   Sq Epi: x / Non Sq Epi: x / Bacteria: x                  Radiology: ***  Bedside ultrasound: ***    CENTRAL LINE: N/Y          DATE INSERTED:              REMOVE: Y/N  VELA: N/Y                       DATE INSERTED:              REMOVE: Y/N  A-LINE: N/Y                       DATE INSERTED:              REMOVE: Y/N    GLOBAL ISSUE/BEST PRACTICE:  Analgesia:  Sedation:  CAM-ICU:   HOB elevation: yes  Stress ulcer prophylaxis:  VTE prophylaxis:  Glycemic control:  Nutrition:    CODE STATUS: ***    Time spent on this patient encounter, which includes documenting this note in the electronic medical record, was 42 minutes including assessing the presenting problems with associated risk, reviewing the medical record to prepare for the encounter, and meeting face to face with patient to obtain additional history. I have also performed an appropriate physical exam, made interventions listed and ordered and interpreted appropriate diagnostic studies as documented. To improve communication and patient safety. I have coordinated care with the multidisciplinary team including the bedside nurse, appropriate attending of record and consultant as needed.         Date of Service is equal to the date of entry    HPI: 34 y/o M w/ pmh of MRDD, seizures d/o, presented to [facility] for allergic reaction requiring intubation for airway protection.    24 hour events: today morning pt extubated    Review of Systems: Unable to obtain ROS 2/2 to MRDD    T(F): 98.4 (05-06-24 @ 20:44), Max: 98.4 (05-06-24 @ 09:00)  HR: 94 (05-06-24 @ 20:00) (53 - 96)  BP: 126/78 (05-06-24 @ 20:00) (123/81 - 155/97)  RR: 18 (05-06-24 @ 20:00) (12 - 18)  SpO2: 96% (05-06-24 @ 20:00) (94% - 100%)  Wt(kg): --    Mode: CPAP with PS, FiO2: 30, PEEP: 5, PS: 10  05-05-24 @ 07:01  -  05-06-24 @ 07:00  --------------------------------------------------------  IN: 1982.6 mL / OUT: 2000 mL / NET: -17.4 mL    05-06-24 @ 07:01  -  05-06-24 @ 21:20  --------------------------------------------------------  IN: 595 mL / OUT: 2500 mL / NET: -1905 mL        CAPILLARY BLOOD GLUCOSE      POCT Blood Glucose.: 130 mg/dL (05 May 2024 18:18)      I&O's Summary    05 May 2024 07:01  -  06 May 2024 07:00  --------------------------------------------------------  IN: 1982.6 mL / OUT: 2000 mL / NET: -17.4 mL    06 May 2024 07:01  -  06 May 2024 21:20  --------------------------------------------------------  IN: 595 mL / OUT: 2500 mL / NET: -1905 mL        Physical Exam:   Gen: comfortable in bed in NAD  Neuro: awake and alert  HEENT: NC/AT  Resp: good air entry b/l  CVS: +RRR  Abd: BSx4, soft, nt/nd  Ext: no edema   Skin: warm/dry    Meds:    amLODIPine   Tablet Oral  hydrALAZINE Injectable IV Push PRN    methylPREDNISolone sodium succinate Injectable IV Push  simvastatin Oral      acetaminophen     Tablet .. Oral PRN  eslicarbazepine Oral  lacosamide Oral  LORazepam   Injectable IV Push PRN  melatonin Oral PRN  ondansetron Injectable IV Push PRN  QUEtiapine Oral      enoxaparin Injectable SubCutaneous    aluminum hydroxide/magnesium hydroxide/simethicone Suspension Oral PRN  famotidine Injectable IV Push    tamsulosin Oral                              14.9   10.51 )-----------( 266      ( 06 May 2024 05:55 )             42.7       05-06    143  |  106  |  18  ----------------------------<  144<H>  3.7   |  26  |  1.29    Ca    9.2      06 May 2024 05:55  Phos  2.9     05-06  Mg     1.5     05-06    TPro  7.8  /  Alb  4.0  /  TBili  0.3  /  DBili  x   /  AST  19  /  ALT  34  /  AlkPhos  40  05-06            Urinalysis Basic - ( 06 May 2024 05:55 )    Color: x / Appearance: x / SG: x / pH: x  Gluc: 144 mg/dL / Ketone: x  / Bili: x / Urobili: x   Blood: x / Protein: x / Nitrite: x   Leuk Esterase: x / RBC: x / WBC x   Sq Epi: x / Non Sq Epi: x / Bacteria: x      Radiology:     < from: Xray Chest 1 View-PORTABLE IMMEDIATE (05.05.24 @ 18:31) >    ACC: [id number] EXAM:  XR CHEST PORTABLE IMMED 1V   ORDERED BY: HERNÁN DOOLEY     PROCEDURE DATE:  05/05/2024          INTERPRETATION:  CLINICAL HISTORY / REASON FOR EXAM:  resp distress    COMPARISON : None.    TECHNIQUE/POSITIONING: Single frontal radiograph of the chest    FINDINGS:    Support Devices: Endotracheal tube terminates above the eleuterio.    Cardiac/Mediastinum/Hilum: Unremarkable.    Lung Parenchyma/Pleura: No focal consolidation, pleural effusion or   pneumothorax.    Skeleton/Soft Tissues: Large lucency overlying the left upper quadrant,   may represent a prominent gastric bubble.    IMPRESSION:    No acute cardiopulmonary disease.    Endotracheal tube in appropriate position.    Large lucency overlying the left upper quadrant, may represent a   prominent gastric bubble. Dedicated abdominal imaging may assist in   further evaluation.    --- End of Report ---       JUAN KEEN MD; Attending Radiologist  This document has been electronically signed. May  6 2024 12:13AM    < end of copied text >      CODE STATUS: FULL CODE    Time spent on this patient encounter, which includes documenting this note in the electronic medical record, was 50 minutes including assessing the presenting problems with associated risk, reviewing the medical record to prepare for the encounter, and meeting face to face with patient to obtain additional history. I have also performed an appropriate physical exam, made interventions listed and ordered and interpreted appropriate diagnostic studies as documented. To improve communication and patient safety. I have coordinated care with the multidisciplinary team including the bedside nurse, appropriate attending of record and consultant as needed.

## 2024-05-06 NOTE — PROGRESS NOTE ADULT - ASSESSMENT
Anaphylactic shock  -intubated in ED, on propofol and precedex and restraints. Extubated this morning  -vitals appear to have stabilized, monitor in SPCU  -maintenance LR  -will plan to monitor ABGs and wean off ventilator as tolerated  -amanda placed for urine output monitoring  -will need training for Epipen and allergy outpatient followup given reported multiple hospitalizations this year    Seizure disorder, intellectual disability  -home vimpat switched to IV  -hold home seroquel while NPO    HTN  -hold home clonidine, HCTZ, amlodipine, carvedilol while NPO  -monitor BP, will consider IV labetalol if SBP>180    HLD  -hold home simvastatin, fenofibrate, Omega-3 while NPO     Anaphylactic shock  -intubated in ED, on propofol and precedex and restraints. Extubated this morning  -vitals appear to have stabilized, monitor in SPCU  -bedside swallow pending, will discontinue maintenance LR  -amanda placed for urine output monitoring, discontinued. Trial of void  -will need training for Epipen and allergy outpatient followup given reported multiple hospitalizations this year    Seizure disorder, intellectual disability  -home vimpat switched to IV while intubated  -resume home aptiom QD, father to bring med from home  -Resume home seroquel    HTN  -held home clonidine, HCTZ, carvedilol on arrival  -resume home amlodipine, will resume other meds if BP not controlled    HLD  -resume home simvastatin, fenofibrate     Anaphylactic shock  -intubated in ED, on propofol and precedex and restraints. Extubated this morning  -vitals appear to have stabilized, monitor in SPCU  -bedside swallow pending, will discontinue maintenance LR  -amanda placed for urine output monitoring, discontinued. Trial of void  -will need training for Epipen and allergy outpatient followup given reported multiple hospitalizations this year    Seizure disorder, intellectual disability  -home vimpat switched to IV while intubated, resume PO  -resume home aptiom QD, father to bring med from home  -Resume home seroquel    HTN  -held home clonidine, HCTZ, carvedilol on arrival  -resume home amlodipine, will resume other meds if BP not controlled    HLD  -resume home simvastatin, fenofibrate

## 2024-05-06 NOTE — CONSULT NOTE ADULT - SUBJECTIVE AND OBJECTIVE BOX
Patient is a 33y old  Male who presents with a chief complaint of respiratory distress.     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 Father reports that patient was visiting a pet store and suddenly developed increased work of breathing, holding his hands to his neck and wheezing. Of note, patient was recently hospitalized at [facility] for reported cardiac arrest of unknown cause and discharged on 2/8 then readmitted for choking episode. Patient has no known allergies as per father but intolerance of tegretol, and lives at home with dogs. Denies smoking. Father reports patient is ambulatory and verbal at baseline.     ED course: Respiration rate 35 on arrival, SpO2 83%. He became agitated and combative in ED, ordered IM epinephrine, IM benadryl, decadron. Patient became unresponsive and cyanotic with SpO2 80% on nonrebreather. He was intubated in ED, started on propofol and precedex drips.       Renal consult called for ADRIANO. History obtained from chart and patient's father at [facility].       PAST MEDICAL HISTORY:  MR (mental retardation), moderate    ADHD    Seizure disorder    Corpus callosum agenesis    Hypertension    CKD (chronic kidney disease), stage III    Hyperlipidemia    Seasonal allergies    Calculus of kidney    History of BPH    H/O constipation    Allergic conjunctivitis    H/O allergic rhinitis        PAST SURGICAL HISTORY:  No significant past surgical history    No significant past surgical history        FAMILY HISTORY:      SOCIAL HISTORY: No smoking or alcohol use     Allergies    Tegretol (Anaphylaxis)    Intolerances      Home Medications:  alfuzosin 10 mg oral tablet, extended release: 1 tab(s) orally once a day (05 May 2024 18:44)  amLODIPine 10 mg oral tablet: 1 tab(s) orally once a day (at bedtime) (05 May 2024 18:44)  Aptiom 800 mg oral tablet: 1 tab(s) orally once a day (05 May 2024 18:44)  carvedilol 25 mg oral tablet: 1 tab(s) orally 2 times a day (05 May 2024 18:44)  cetirizine 10 mg oral tablet: 1 tab(s) orally once a day (05 May 2024 18:44)  cloNIDine 0.1 mg oral tablet: 2 tab(s) orally 3 times a day (05 May 2024 18:44)  docusate sodium 100 mg oral capsule: 1 cap(s) orally once a day (05 May 2024 18:44)  fenofibrate 48 mg oral tablet: orally once a day (05 May 2024 18:44)  hydroCHLOROthiazide 25 mg oral tablet: 1 tab(s) orally once a day (05 May 2024 18:44)  multivitamin: 1 tab(s) orally once a day (05 May 2024 18:44)  Omega-3 Fish Oil 1000 mg oral capsule: 1 cap(s) orally 2 times a day (05 May 2024 18:44)  potassium citrate - citric acid: 15 milliliter(s) orally 2 times a day (05 May 2024 18:44)  QUEtiapine 200 mg oral tablet: orally 3 times a day (05 May 2024 18:44)  simvastatin 10 mg oral tablet: 1 tab(s) orally once a day (at bedtime) (05 May 2024 18:44)  Vimpat 200 mg oral tablet: 1 tab(s) orally 2 times a day (05 May 2024 18:44)  Vimpat 50 mg oral tablet: 1 tab(s) orally 2 times a day (05 May 2024 18:44)  Vitamin D3 1000 units Po Daily:  (05 May 2024 18:44)    MEDICATIONS  (STANDING):  amLODIPine   Tablet 10 milliGRAM(s) Oral at bedtime  diphenhydrAMINE Injectable 25 milliGRAM(s) IV Push every 6 hours  enoxaparin Injectable 40 milliGRAM(s) SubCutaneous every 24 hours  eslicarbazepine 800 milliGRAM(s) Oral daily  famotidine Injectable 20 milliGRAM(s) IV Push two times a day  lacosamide 200 milliGRAM(s) Oral two times a day  methylPREDNISolone sodium succinate Injectable 40 milliGRAM(s) IV Push every 8 hours  QUEtiapine 200 milliGRAM(s) Oral three times a day  simvastatin 10 milliGRAM(s) Oral at bedtime  tamsulosin 0.8 milliGRAM(s) Oral at bedtime    MEDICATIONS  (PRN):  acetaminophen     Tablet .. 650 milliGRAM(s) Oral every 6 hours PRN Temp greater or equal to 38C (100.4F), Mild Pain (1 - 3)  aluminum hydroxide/magnesium hydroxide/simethicone Suspension 30 milliLiter(s) Oral every 4 hours PRN Dyspepsia  hydrALAZINE Injectable 10 milliGRAM(s) IV Push every 6 hours PRN   LORazepam   Injectable 2 milliGRAM(s) IV Push every 1 hour PRN Agitation  melatonin 3 milliGRAM(s) Oral at bedtime PRN Insomnia  ondansetron Injectable 4 milliGRAM(s) IV Push every 8 hours PRN Nausea and/or Vomiting      REVIEW OF SYSTEMS:  General: no distress  Respiratory: No SOB  Cardiovascular: No CP or Palpitations	  Gastrointestinal: No nausea, Vomiting. No diarrhea  Genitourinary: No urinary complaints	  Musculoskeletal: No leg swelling, No new rash or lesions	  all other systems negative    T(F): 98.3 (05-06-24 @ 16:03), Max: 98.4 (05-06-24 @ 09:00)  HR: 86 (05-06-24 @ 14:00) (53 - 96)  BP: 136/91 (05-06-24 @ 14:00) (114/71 - 162/78)  RR: 17 (05-06-24 @ 14:00) (12 - 35)  SpO2: 100% (05-06-24 @ 14:00) (83% - 100%)  Wt(kg): --    PHYSICAL EXAM:  General: NAD  Respiratory: b/l air entry  Cardiovascular: S1 S2  Gastrointestinal: soft  Extremities: no edema        05-06    143  |  106  |  18  ----------------------------<  144<H>  3.7   |  26  |  1.29    Ca    9.2      06 May 2024 05:55  Phos  2.9     05-06  Mg     1.5     05-06    TPro  7.8  /  Alb  4.0  /  TBili  0.3  /  DBili  x   /  AST  19  /  ALT  34  /  AlkPhos  40  05-06                          14.9   10.51 )-----------( 266      ( 06 May 2024 05:55 )             42.7       Hematocrit: 42.7 % (05-06 @ 05:55)  Hemoglobin: 14.9 g/dL (05-06 @ 05:55)  Blood Urea Nitrogen: 18 mg/dL (05-06 @ 05:55)  Potassium: 3.7 mmol/L (05-06 @ 05:55)      Creatinine, Serum: 1.29 (05-06 @ 05:55)  Creatinine, Serum: 1.73 (05-05 @ 17:52)      Urinalysis Basic - ( 06 May 2024 05:55 )    Color: x / Appearance: x / SG: x / pH: x  Gluc: 144 mg/dL / Ketone: x  / Bili: x / Urobili: x   Blood: x / Protein: x / Nitrite: x   Leuk Esterase: x / RBC: x / WBC x   Sq Epi: x / Non Sq Epi: x / Bacteria: x      LIVER FUNCTIONS - ( 06 May 2024 05:55 )  Alb: 4.0 g/dL / Pro: 7.8 g/dL / ALK PHOS: 40 U/L / ALT: 34 U/L / AST: 19 U/L / GGT: x                     ABG - ( 06 May 2024 06:00 )  pH, Arterial: 7.40  pH, Blood: x     /  pCO2: 49    /  pO2: 125   / HCO3: 30    / Base Excess: 5.6   /  SaO2: 99.2              I&O's Detail    05 May 2024 07:01  -  06 May 2024 07:00  --------------------------------------------------------  IN:    Dexmedetomidine: 228 mL    IV PiggyBack: 50 mL    Lactated Ringers: 1440 mL    Propofol: 244.4 mL    Propofol: 20.2 mL  Total IN: 1982.6 mL    OUT:    Indwelling Catheter - Urethral (mL): 2000 mL  Total OUT: 2000 mL    Total NET: -17.4 mL      06 May 2024 07:01  -  06 May 2024 16:11  --------------------------------------------------------  IN:    Dexmedetomidine: 15 mL    IV PiggyBack: 100 mL    Lactated Ringers: 480 mL  Total IN: 595 mL    OUT:    Indwelling Catheter - Urethral (mL): 1300 mL    Propofol: 0 mL    Voided (mL): 400 mL  Total OUT: 1700 mL    Total NET: -1105 mL      < from: Xray Chest 1 View-PORTABLE IMMEDIATE (05.05.24 @ 18:31) >    ACC: [id number] EXAM:  XR CHEST PORTABLE IMMED 1V   ORDERED BY: HERNÁN DOOLEY     PROCEDURE DATE:  05/05/2024          INTERPRETATION:  CLINICAL HISTORY / REASON FOR EXAM:  resp distress    COMPARISON : None.    TECHNIQUE/POSITIONING: Single frontal radiograph of the chest    FINDINGS:    Support Devices: Endotracheal tube terminates above the eleuterio.    Cardiac/Mediastinum/Hilum: Unremarkable.    Lung Parenchyma/Pleura: No focal consolidation, pleural effusion or   pneumothorax.    Skeleton/Soft Tissues: Large lucency overlying the left upper quadrant,   may represent a prominent gastric bubble.    IMPRESSION:    No acute cardiopulmonary disease.    Endotracheal tube in appropriate position.    Large lucency overlying the left upper quadrant, may represent a   prominent gastric bubble. Dedicated abdominal imaging may assist in   further evaluation.    --- End of Report ---             JUAN KEEN MD; Attending Radiologist  This document has been electronically signed. May  6 2024 12:13AM    < end of copied text >        
Date/Time Patient Seen:  		  Referring MD:   Data Reviewed	       Patient is a 33y old  Male who presents with a chief complaint of     Subjective/HPI   · Chief Complaint: The patient is a 33y Male complaining of shortness of breath.  · Unable to Obtain: Urgent need for Intervention  · HPI Objective Statement: Patient is a 33-year-old male with past medical history of mental disability and seizures presents with allergic reaction.  Per father patient was at a pet store and had acute onset of difficulty breathing.  Patient had similar episode in February where he was intubated at the time.  Unable to obtain further information due to patient being combative.    HIV:    HIV Test Questions:  · In accordance with [place] State law, we offer every patient who comes to our ED an HIV test. Would you like to be tested today?	Unable to answer due to medical condition/unresponsive/etc...    PAST MEDICAL & SURGICAL HISTORY:  MR (mental retardation), moderate    ADHD    Seizure disorder    Corpus callosum agenesis    Hypertension    CKD (chronic kidney disease), stage III    Hyperlipidemia    Seasonal allergies    Calculus of kidney    History of BPH    H/O constipation    Allergic conjunctivitis    H/O allergic rhinitis    No significant past surgical history    No significant past surgical history    H/O allergic rhinitis     H/O constipation     History of BPH     Hyperlipidemia     Hypertension     MR (mental retardation), moderate     Seasonal allergies     Seizure disorder.     PAST SURGICAL HISTORY:  No significant past surgical history.     Tobacco Usage:  · Tobacco Usage	Never smoker    ALLERGIES AND HOME MEDICATIONS:   Allergies:        Allergies:  	Tegretol: Drug, Anaphylaxis    Home Medications:   * Patient Currently Takes Medications as of 14-Oct-2022 22:25 documented in Structured Notes  · 	Vimpat 50 mg oral tablet: 1 tab(s) orally 2 times a day  · 	cloNIDine 0.1 mg oral tablet: 2 tab(s) orally 3 times a day  · 	hydroCHLOROthiazide 25 mg oral tablet: 1 tab(s) orally once a day  · 	Fish Oil 1000 mg oral capsule: orally 2 times a day  · 	amLODIPine 10 mg oral tablet: 1 tab(s) orally once a day (at bedtime)  · 	simvastatin 10 mg oral tablet: 1 tab(s) orally once a day (at bedtime)  · 	docusate sodium 100 mg oral capsule: 1 cap(s) orally once a day  · 	multivitamin: 1 tab(s) orally once a day  · 	cetirizine 10 mg oral tablet: 1 tab(s) orally once a day  · 	carvedilol 25 mg oral tablet: 1 tab(s) orally 2 times a day  · 	QUEtiapine 200 mg oral tablet: orally 3 times a day  · 	Nasacort Allergy 24HR 55 mcg/inh nasal spray: nasal 2 times a day, 1 spray each nostril   · 	fenofibrate 48 mg oral tablet: orally once a day  · 	alfuzosin 10 mg oral tablet, extended release: 1 tab(s) orally once a day  · 	Vimpat 200 mg oral tablet: 1 tab(s) orally 2 times a day  · 	Aptiom 800 mg oral tablet: 1 tab(s) orally once a day  · 	potassium citrate - citric acid: 15 milliliter(s) orally 2 times a day  · 	azelastine hcl 0.05% solution: 1 drop each eye  · 	Triamcinolone Acetonide in Absorbase 0.1%: apply to back at bedtime     REVIEW OF SYSTEMS:    Review of Systems:  · UNABLE TO OBTAIN: Urgent need for Intervention        Medication list         MEDICATIONS  (STANDING):  chlorhexidine 0.12% Liquid 15 milliLiter(s) Oral Mucosa every 12 hours  diphenhydrAMINE Injectable 25 milliGRAM(s) IntraMuscular once  diphenhydrAMINE Injectable 25 milliGRAM(s) IV Push once  EPINEPHrine     1 mG/mL Injectable 0.3 milliGRAM(s) IntraMuscular once  methylPREDNISolone sodium succinate Injectable 125 milliGRAM(s) IV Push once  midazolam Injectable 2 milliGRAM(s) IV Push Once  propofol Infusion 45 MICROgram(s)/kG/Min (22.7 mL/Hr) IV Continuous <Continuous>  sodium chloride 0.9% Bolus 1000 milliLiter(s) IV Bolus once    MEDICATIONS  (PRN):         Vitals log        ICU Vital Signs Last 24 Hrs  T(C): --  T(F): --  HR: 76 (05 May 2024 17:46) (76 - 76)  BP: 162/78 (05 May 2024 17:46) (162/78 - 162/78)  BP(mean): --  ABP: --  ABP(mean): --  RR: 35 (05 May 2024 17:46) (35 - 35)  SpO2: 83% (05 May 2024 17:46) (83% - 83%)    O2 Parameters below as of 05 May 2024 17:46  Patient On (Oxygen Delivery Method): room air                 Input and Output:  I&O's Detail      Lab Data                        15.6   16.21 )-----------( 324      ( 05 May 2024 17:52 )             48.9     05-05    144  |  101  |  22  ----------------------------<  128<H>  4.2   |  27  |  1.73<H>    Ca    10.2      05 May 2024 17:52              Review of Systems	  resp failure  intubated      Objective     Physical Examination    heart s1s2  lung dc bS  head nc  amanda cath  IV  head at  chest symmetric      Pertinent Lab findings & Imaging      Peggy:  NO   Adequate UO     I&O's Detail           Discussed with:     Cultures:	        Radiology                            
History of Present Illness: The patient is a 33 year old male with a history of HTN, HL, intellectual disability, seizure disorder who presents with respiratory distress. The patient is intubated and unable to provide additional history. He had sudden onset shortness of breath and wheezing while in a pet store. He was intubated in ED.    Past Medical/Surgical History:  HTN, HL, intellectual disability, seizure disorder     Medications:  Home Medications:  alfuzosin 10 mg oral tablet, extended release: 1 tab(s) orally once a day (05 May 2024 18:44)  amLODIPine 10 mg oral tablet: 1 tab(s) orally once a day (at bedtime) (05 May 2024 18:44)  Aptiom 800 mg oral tablet: 1 tab(s) orally once a day (05 May 2024 18:44)  carvedilol 25 mg oral tablet: 1 tab(s) orally 2 times a day (05 May 2024 18:44)  cetirizine 10 mg oral tablet: 1 tab(s) orally once a day (05 May 2024 18:44)  cloNIDine 0.1 mg oral tablet: 2 tab(s) orally 3 times a day (05 May 2024 18:44)  docusate sodium 100 mg oral capsule: 1 cap(s) orally once a day (05 May 2024 18:44)  fenofibrate 48 mg oral tablet: orally once a day (05 May 2024 18:44)  hydroCHLOROthiazide 25 mg oral tablet: 1 tab(s) orally once a day (05 May 2024 18:44)  multivitamin: 1 tab(s) orally once a day (05 May 2024 18:44)  Omega-3 Fish Oil 1000 mg oral capsule: 1 cap(s) orally 2 times a day (05 May 2024 18:44)  potassium citrate - citric acid: 15 milliliter(s) orally 2 times a day (05 May 2024 18:44)  QUEtiapine 200 mg oral tablet: orally 3 times a day (05 May 2024 18:44)  simvastatin 10 mg oral tablet: 1 tab(s) orally once a day (at bedtime) (05 May 2024 18:44)  Vimpat 200 mg oral tablet: 1 tab(s) orally 2 times a day (05 May 2024 18:44)  Vimpat 50 mg oral tablet: 1 tab(s) orally 2 times a day (05 May 2024 18:44)  Vitamin D3 1000 units Po Daily:  (05 May 2024 18:44)      Family History: Non-contributory family history of premature cardiovascular atherosclerotic disease    Social History: No tobacco, alcohol or drug use    Review of Systems:  Unable to obtain    Physical Exam:  Vitals:        Vital Signs Last 24 Hrs  T(C): 36.7 (06 May 2024 08:00), Max: 36.7 (06 May 2024 08:00)  T(F): 98.1 (06 May 2024 08:00), Max: 98.1 (06 May 2024 08:00)  HR: 64 (06 May 2024 08:00) (53 - 96)  BP: 136/80 (06 May 2024 08:00) (114/71 - 162/78)  BP(mean): 99 (06 May 2024 08:00) (87 - 119)  RR: 16 (06 May 2024 08:00) (15 - 35)  SpO2: 96% (06 May 2024 08:00) (83% - 100%)  Parameters below as of 06 May 2024 08:00  Patient On (Oxygen Delivery Method): ventilator  General: Sedated  HEENT: Intubated  Neck: No JVD, no carotid bruit  Lungs: CTAB  CV: RRR, nl S1/S2, no M/R/G  Abdomen: S/NT/ND, +BS  Extremities: No LE edema, no cyanosis  Neuro: AAOx0  Skin: No rash    Labs:                        14.9   10.51 )-----------( 266      ( 06 May 2024 05:55 )             42.7     05-06    143  |  106  |  18  ----------------------------<  144<H>  3.7   |  26  |  1.29    Ca    9.2      06 May 2024 05:55  Phos  2.9     05-06  Mg     1.5     05-06    TPro  7.8  /  Alb  4.0  /  TBili  0.3  /  DBili  x   /  AST  19  /  ALT  34  /  AlkPhos  40  05-06            ECG/Telemetry: NSR, normal axis, no ST abnormality

## 2024-05-06 NOTE — PROGRESS NOTE ADULT - ASSESSMENT
32 y/o M w/ pmh of MRDD, seizures d/o, presented to [facility] for allergic reaction requiring intubation for airway protection.      -Neuro: pt now extubated at baseline cont to monitor, seizures cont AEDs  Cardiac: HDS maintain MAP >65  -Resp: Acute Hypoxic resp failure 2/2 to anaphylaxis now resolved pt extubated, maintain o2 >90%  -GI: cont diet as ordered/tolerated  -Renal: Renal function cont to monitor along w/ renal function and lytes, hypoMag s/p 1g of mag  -ID: No acute infectious process  -Endo: No acute issues  -Heme: DVT ppx w/ lovenox  -Dispo: Pt remains in the SPCU for close observation, pt is full code, dispo pending hospital course

## 2024-05-06 NOTE — CARE COORDINATION ASSESSMENT. - NSCAREPROVIDERS_GEN_ALL_CORE_FT
CARE PROVIDERS:  Accepting Physician: Александр Ruiz  Access Services: Magdi Dyer  Administration: Sampson Peres  Admitting: Александр Ruiz  Attending: Александр Ruiz  Case Management: Mya Renteria  Consultant: Durga Roman  Consultant: Channing Alexander  Consultant: Edwin Tovar  Consultant: Manuel Melendez  Consultant: Mark Bowden  Covering Team: Benny Braun  ED ACP: Saloni Mcfarlane  ED Attending: Chema Wasserman  ED Nurse: Nyasia Hopper  Infection Control: Nilam Alvarado  Nurse: Armida Lovett  Nurse: Mar Myers  Nurse: Madyson Maria  Nurse: Gisel Alfredo  [facility]  Outpatient Provider: Channing Alexander  Outpatient Provider: Edwin Tovar  PCA/Nursing Assistant: Michelle Muñoz  Primary Team: Александр Ruiz  Primary Team: Gretchen Kaur  Registered Dietitian: Tamar Cortés  Registered Dietitian: Rima Georges  Research: Edwin Tovar  Respiratory Therapy: Melany Ugarte  Respiratory Therapy: Leidy Santana  : Susana Redmond  : Colt Harper  Speech Pathology: Deanna Leach  Team: [facility], Team  Team: [facility], Team  UR// Supp. Assoc.: Miguel Angel Michelle   CARE PROVIDERS:  Accepting Physician: Александр Ruiz  Access Services: Magdi Dyer  Administration: Sampson Peres  Admitting: Александр Ruiz  Attending: Александр Ruiz  Case Management: Mya Renteria  Consultant: Mark Bowden  Consultant: Durga Roman  Consultant: Channing Alexander  Consultant: Edwin Tovar  Consultant: Manuel Melendez  Covering Team: Benny Braun  ED ACP: Saloni Mcfarlane  ED Attending: Chema Wasserman  ED Nurse: Nyasia Hopper  HIM/Billing & Coding: Dayami Dangelo  Infection Control: Nilam Alvarado  Nurse: Armida Lovett  Nurse: Gisel Alfredo  Nurse: Mar Myers  Nurse: Madyson Maria  Ordered: [facility]  Outpatient Provider: Channing Alexander  Outpatient Provider: Edwin Tovar  PCA/Nursing Assistant: Michelle Muñoz  Primary Team: Gretchen Kaur  Primary Team: Александр Ruiz  Registered Dietitian: Tamar Cortés  Registered Dietitian: Rima Georges  Research: Edwin Tovar  Respiratory Therapy: Melany Ugarte  Respiratory Therapy: Leidy Santana  : Susana Redmond  : Colt Harper  Speech Pathology: Deanna Leach  Speech Pathology: Maria Luisa Mckeon  Team: [facility], Team  Team: [facility] Hospitalists, Team  UR// Supp. Assoc.: Miguel Angel Michelle  UR// Supp. Assoc.: Zuly Pressley

## 2024-05-06 NOTE — SWALLOW BEDSIDE ASSESSMENT ADULT - SWALLOW EVAL: DIAGNOSIS
Mild oral dysphagia marked by reduced attention to bolus, impulsivity with self feeding resulting in rapid rate of intake.  Pharyngeal stage deemed functional, no overt s/s penetration/aspiration noted.

## 2024-05-06 NOTE — PROGRESS NOTE ADULT - SUBJECTIVE AND OBJECTIVE BOX
Patient is a 33y old  Male who presents with a chief complaint of     INTERVAL HPI/OVERNIGHT EVENTS:        REVIEW OF SYSTEMS:  CONSTITUTIONAL: No fever, weight loss, or fatigue  EYES: No eye pain, visual disturbances, or discharge  ENMT:  No difficulty hearing, tinnitus, vertigo; No sinus or throat pain  NECK: No pain or stiffness  RESPIRATORY: No cough, wheezing, chills or hemoptysis; No shortness of breath  CARDIOVASCULAR: No chest pain, palpitations, lightheadedness, or leg swelling  GASTROINTESTINAL: No abdominal or epigastric pain. No nausea, vomiting, or hematemesis; No diarrhea or constipation. No melena or hematochezia.  GENITOURINARY: No dysuria, frequency, hematuria, or incontinence  NEUROLOGICAL: No headaches, memory loss, vertigo, loss of strength, numbness, or tremors  SKIN: No itching, burning, rashes, or lesions   LYMPH NODES: No enlarged glands  ENDOCRINE: No heat or cold intolerance; No hair loss; No polydipsia or polyuria  MUSCULOSKELETAL: No joint pain or swelling; No muscle, back, or extremity pain  PSYCHIATRIC: No depression, anxiety, or mood swings  HEME/LYMPH: No easy bruising, or bleeding gums  ALLERGY AND IMMUNOLOGIC: No hives or eczema    PHYSICAL EXAM:  GENERAL: NAD, well-groomed, well-developed  HEAD:  Atraumatic, Normocephalic  EYES: EOMI, PERRLA, conjunctiva and sclera clear  ENMT: Moist mucous membranes, Good dentition, No lesions  NECK: Supple, No JVD appreciated  NERVOUS SYSTEM:  Alert & Oriented X3, Good concentration; All 4 extremities mobile, no gross sensory deficits.   CHEST/LUNG: Clear to auscultation bilaterally; No rales, rhonchi, wheezing, or rubs appreciated  HEART: Regular rate and rhythm; No murmurs, rubs, or gallops  ABDOMEN: Soft, Nontender, Nondistended  EXTREMITIES:  No clubbing, cyanosis, or edema appreciated  LYMPH: No lymphadenopathy noted  SKIN: No rashes or lesions appreciated    MEDICATIONS  (STANDING):  amLODIPine   Tablet 10 milliGRAM(s) Oral at bedtime  diphenhydrAMINE Injectable 25 milliGRAM(s) IV Push every 6 hours  enoxaparin Injectable 40 milliGRAM(s) SubCutaneous every 24 hours  eslicarbazepine 800 milliGRAM(s) Oral daily  famotidine Injectable 20 milliGRAM(s) IV Push two times a day  lacosamide IVPB 200 milliGRAM(s) IV Intermittent every 12 hours  lactated ringers. 1000 milliLiter(s) (120 mL/Hr) IV Continuous <Continuous>  methylPREDNISolone sodium succinate Injectable 40 milliGRAM(s) IV Push every 8 hours  QUEtiapine 200 milliGRAM(s) Oral three times a day  simvastatin 10 milliGRAM(s) Oral at bedtime  tamsulosin 0.8 milliGRAM(s) Oral at bedtime    MEDICATIONS  (PRN):  acetaminophen     Tablet .. 650 milliGRAM(s) Oral every 6 hours PRN Temp greater or equal to 38C (100.4F), Mild Pain (1 - 3)  aluminum hydroxide/magnesium hydroxide/simethicone Suspension 30 milliLiter(s) Oral every 4 hours PRN Dyspepsia  hydrALAZINE Injectable 10 milliGRAM(s) IV Push every 6 hours PRN   LORazepam   Injectable 2 milliGRAM(s) IV Push every 1 hour PRN Agitation  melatonin 3 milliGRAM(s) Oral at bedtime PRN Insomnia  ondansetron Injectable 4 milliGRAM(s) IV Push every 8 hours PRN Nausea and/or Vomiting      Allergies    Tegretol (Anaphylaxis)    Intolerances        Vital Signs Last 24 Hrs  T(C): 36.7 (06 May 2024 10:00), Max: 36.9 (06 May 2024 09:00)  T(F): 98.1 (06 May 2024 10:00), Max: 98.4 (06 May 2024 09:00)  HR: 57 (06 May 2024 10:00) (53 - 96)  BP: 145/92 (06 May 2024 10:00) (114/71 - 162/78)  BP(mean): 108 (06 May 2024 10:00) (87 - 119)  RR: 13 (06 May 2024 10:00) (13 - 35)  SpO2: 98% (06 May 2024 10:00) (83% - 100%)    Parameters below as of 06 May 2024 09:00  Patient On (Oxygen Delivery Method): mask, aerosol        LABS:                        14.9   10.51 )-----------( 266      ( 06 May 2024 05:55 )             42.7     06 May 2024 05:55    143    |  106    |  18     ----------------------------<  144    3.7     |  26     |  1.29     Ca    9.2        06 May 2024 05:55  Phos  2.9       06 May 2024 05:55  Mg     1.5       06 May 2024 05:55    TPro  7.8    /  Alb  4.0    /  TBili  0.3    /  DBili  x      /  AST  19     /  ALT  34     /  AlkPhos  40     06 May 2024 05:55      Urinalysis Basic - ( 06 May 2024 05:55 )    Color: x / Appearance: x / SG: x / pH: x  Gluc: 144 mg/dL / Ketone: x  / Bili: x / Urobili: x   Blood: x / Protein: x / Nitrite: x   Leuk Esterase: x / RBC: x / WBC x   Sq Epi: x / Non Sq Epi: x / Bacteria: x      CAPILLARY BLOOD GLUCOSE      POCT Blood Glucose.: 130 mg/dL (05 May 2024 18:18)     Patient is a 33y old  Male who presents with a chief complaint of     INTERVAL HPI/OVERNIGHT EVENTS:  Patient seen awake in bed.       REVIEW OF SYSTEMS:  CONSTITUTIONAL: No fever, weight loss, or fatigue  EYES: No eye pain, visual disturbances, or discharge  ENMT:  No difficulty hearing, tinnitus, vertigo; No sinus or throat pain  NECK: No pain or stiffness  RESPIRATORY: No cough, wheezing, chills or hemoptysis; No shortness of breath  CARDIOVASCULAR: No chest pain, palpitations, lightheadedness, or leg swelling  GASTROINTESTINAL: No abdominal or epigastric pain. No nausea, vomiting, or hematemesis; No diarrhea or constipation. No melena or hematochezia.  GENITOURINARY: No dysuria, frequency, hematuria, or incontinence  NEUROLOGICAL: No headaches, memory loss, vertigo, loss of strength, numbness, or tremors  SKIN: No itching, burning, rashes, or lesions   LYMPH NODES: No enlarged glands  ENDOCRINE: No heat or cold intolerance; No hair loss; No polydipsia or polyuria  MUSCULOSKELETAL: No joint pain or swelling; No muscle, back, or extremity pain  PSYCHIATRIC: No depression, anxiety, or mood swings  HEME/LYMPH: No easy bruising, or bleeding gums  ALLERGY AND IMMUNOLOGIC: No hives or eczema    PHYSICAL EXAM:  GENERAL: NAD, well-groomed, well-developed  HEAD:  Atraumatic, Normocephalic  EYES: EOMI, PERRLA, conjunctiva and sclera clear  ENMT: Moist mucous membranes, Good dentition, No lesions  NECK: Supple, No JVD appreciated  NERVOUS SYSTEM:  Alert & Oriented X3, Good concentration; All 4 extremities mobile, no gross sensory deficits.   CHEST/LUNG: Clear to auscultation bilaterally; No rales, rhonchi, wheezing, or rubs appreciated  HEART: Regular rate and rhythm; No murmurs, rubs, or gallops  ABDOMEN: Soft, Nontender, Nondistended  EXTREMITIES:  No clubbing, cyanosis, or edema appreciated  LYMPH: No lymphadenopathy noted  SKIN: No rashes or lesions appreciated    MEDICATIONS  (STANDING):  amLODIPine   Tablet 10 milliGRAM(s) Oral at bedtime  diphenhydrAMINE Injectable 25 milliGRAM(s) IV Push every 6 hours  enoxaparin Injectable 40 milliGRAM(s) SubCutaneous every 24 hours  eslicarbazepine 800 milliGRAM(s) Oral daily  famotidine Injectable 20 milliGRAM(s) IV Push two times a day  lacosamide IVPB 200 milliGRAM(s) IV Intermittent every 12 hours  lactated ringers. 1000 milliLiter(s) (120 mL/Hr) IV Continuous <Continuous>  methylPREDNISolone sodium succinate Injectable 40 milliGRAM(s) IV Push every 8 hours  QUEtiapine 200 milliGRAM(s) Oral three times a day  simvastatin 10 milliGRAM(s) Oral at bedtime  tamsulosin 0.8 milliGRAM(s) Oral at bedtime    MEDICATIONS  (PRN):  acetaminophen     Tablet .. 650 milliGRAM(s) Oral every 6 hours PRN Temp greater or equal to 38C (100.4F), Mild Pain (1 - 3)  aluminum hydroxide/magnesium hydroxide/simethicone Suspension 30 milliLiter(s) Oral every 4 hours PRN Dyspepsia  hydrALAZINE Injectable 10 milliGRAM(s) IV Push every 6 hours PRN   LORazepam   Injectable 2 milliGRAM(s) IV Push every 1 hour PRN Agitation  melatonin 3 milliGRAM(s) Oral at bedtime PRN Insomnia  ondansetron Injectable 4 milliGRAM(s) IV Push every 8 hours PRN Nausea and/or Vomiting      Allergies    Tegretol (Anaphylaxis)    Intolerances        Vital Signs Last 24 Hrs  T(C): 36.7 (06 May 2024 10:00), Max: 36.9 (06 May 2024 09:00)  T(F): 98.1 (06 May 2024 10:00), Max: 98.4 (06 May 2024 09:00)  HR: 57 (06 May 2024 10:00) (53 - 96)  BP: 145/92 (06 May 2024 10:00) (114/71 - 162/78)  BP(mean): 108 (06 May 2024 10:00) (87 - 119)  RR: 13 (06 May 2024 10:00) (13 - 35)  SpO2: 98% (06 May 2024 10:00) (83% - 100%)    Parameters below as of 06 May 2024 09:00  Patient On (Oxygen Delivery Method): mask, aerosol        LABS:                        14.9   10.51 )-----------( 266      ( 06 May 2024 05:55 )             42.7     06 May 2024 05:55    143    |  106    |  18     ----------------------------<  144    3.7     |  26     |  1.29     Ca    9.2        06 May 2024 05:55  Phos  2.9       06 May 2024 05:55  Mg     1.5       06 May 2024 05:55    TPro  7.8    /  Alb  4.0    /  TBili  0.3    /  DBili  x      /  AST  19     /  ALT  34     /  AlkPhos  40     06 May 2024 05:55      Urinalysis Basic - ( 06 May 2024 05:55 )    Color: x / Appearance: x / SG: x / pH: x  Gluc: 144 mg/dL / Ketone: x  / Bili: x / Urobili: x   Blood: x / Protein: x / Nitrite: x   Leuk Esterase: x / RBC: x / WBC x   Sq Epi: x / Non Sq Epi: x / Bacteria: x      CAPILLARY BLOOD GLUCOSE      POCT Blood Glucose.: 130 mg/dL (05 May 2024 18:18)     Patient is a 33y old  Male who presents with a chief complaint of     INTERVAL HPI/OVERNIGHT EVENTS:  Patient seen awake in bed. He says he feels better today. Overnight patient was extubated before rounds. He endorses some chest discomfort. SLP reports patient tolerated bedside swallow testing. No reported overnight events.       REVIEW OF SYSTEMS:  CONSTITUTIONAL: No fever, weight loss, or fatigue  EYES: No eye pain, visual disturbances, or discharge  ENMT:  No difficulty hearing, tinnitus, vertigo; No sinus or throat pain  NECK: No pain or stiffness  RESPIRATORY: No cough, wheezing, chills or hemoptysis; No shortness of breath  CARDIOVASCULAR: No chest pain, palpitations, lightheadedness, or leg swelling  GASTROINTESTINAL: No abdominal or epigastric pain. No nausea, vomiting, or hematemesis; No diarrhea or constipation. No melena or hematochezia.  GENITOURINARY: No dysuria  NEUROLOGICAL: No headaches, memory loss, vertigo, loss of strength, numbness, or tremors  SKIN: No itching, burning, rashes, or lesions   MUSCULOSKELETAL: No joint pain or swelling; No muscle, back, or extremity pain    PHYSICAL EXAM:  GENERAL: Obese adult male, NAD, well-groomed, well-developed  HEAD:  Atraumatic, Normocephalic  EYES: EOMI, PERRLA, conjunctiva and sclera clear  ENMT: Moist mucous membranes, Good dentition, No lesions  NECK: Supple, No JVD appreciated  NERVOUS SYSTEM:  Alert does not appear fully oriented; All 4 extremities mobile, no gross sensory deficits.   CHEST/LUNG: Clear to auscultation bilaterally; No rales, rhonchi, wheezing, or rubs appreciated  HEART: Regular rate and rhythm; No murmurs, rubs, or gallops  ABDOMEN: Soft, Nontender, Nondistended  EXTREMITIES:  No clubbing, cyanosis, or edema appreciated  LYMPH: No lymphadenopathy noted  SKIN: No rashes or lesions appreciated    MEDICATIONS  (STANDING):  amLODIPine   Tablet 10 milliGRAM(s) Oral at bedtime  diphenhydrAMINE Injectable 25 milliGRAM(s) IV Push every 6 hours  enoxaparin Injectable 40 milliGRAM(s) SubCutaneous every 24 hours  eslicarbazepine 800 milliGRAM(s) Oral daily  famotidine Injectable 20 milliGRAM(s) IV Push two times a day  lacosamide IVPB 200 milliGRAM(s) IV Intermittent every 12 hours  lactated ringers. 1000 milliLiter(s) (120 mL/Hr) IV Continuous <Continuous>  methylPREDNISolone sodium succinate Injectable 40 milliGRAM(s) IV Push every 8 hours  QUEtiapine 200 milliGRAM(s) Oral three times a day  simvastatin 10 milliGRAM(s) Oral at bedtime  tamsulosin 0.8 milliGRAM(s) Oral at bedtime    MEDICATIONS  (PRN):  acetaminophen     Tablet .. 650 milliGRAM(s) Oral every 6 hours PRN Temp greater or equal to 38C (100.4F), Mild Pain (1 - 3)  aluminum hydroxide/magnesium hydroxide/simethicone Suspension 30 milliLiter(s) Oral every 4 hours PRN Dyspepsia  hydrALAZINE Injectable 10 milliGRAM(s) IV Push every 6 hours PRN   LORazepam   Injectable 2 milliGRAM(s) IV Push every 1 hour PRN Agitation  melatonin 3 milliGRAM(s) Oral at bedtime PRN Insomnia  ondansetron Injectable 4 milliGRAM(s) IV Push every 8 hours PRN Nausea and/or Vomiting      Allergies    Tegretol (Anaphylaxis)    Intolerances        Vital Signs Last 24 Hrs  T(C): 36.7 (06 May 2024 10:00), Max: 36.9 (06 May 2024 09:00)  T(F): 98.1 (06 May 2024 10:00), Max: 98.4 (06 May 2024 09:00)  HR: 57 (06 May 2024 10:00) (53 - 96)  BP: 145/92 (06 May 2024 10:00) (114/71 - 162/78)  BP(mean): 108 (06 May 2024 10:00) (87 - 119)  RR: 13 (06 May 2024 10:00) (13 - 35)  SpO2: 98% (06 May 2024 10:00) (83% - 100%)    Parameters below as of 06 May 2024 09:00  Patient On (Oxygen Delivery Method): mask, aerosol        LABS:                        14.9   10.51 )-----------( 266      ( 06 May 2024 05:55 )             42.7     06 May 2024 05:55    143    |  106    |  18     ----------------------------<  144    3.7     |  26     |  1.29     Ca    9.2        06 May 2024 05:55  Phos  2.9       06 May 2024 05:55  Mg     1.5       06 May 2024 05:55    TPro  7.8    /  Alb  4.0    /  TBili  0.3    /  DBili  x      /  AST  19     /  ALT  34     /  AlkPhos  40     06 May 2024 05:55      Urinalysis Basic - ( 06 May 2024 05:55 )    Color: x / Appearance: x / SG: x / pH: x  Gluc: 144 mg/dL / Ketone: x  / Bili: x / Urobili: x   Blood: x / Protein: x / Nitrite: x   Leuk Esterase: x / RBC: x / WBC x   Sq Epi: x / Non Sq Epi: x / Bacteria: x      CAPILLARY BLOOD GLUCOSE      POCT Blood Glucose.: 130 mg/dL (05 May 2024 18:18)

## 2024-05-06 NOTE — CONSULT NOTE ADULT - ASSESSMENT
33-year-old male with past medical history of mental disability and seizures presents with allergic reaction.  Per father patient was at a pet store and had acute onset of difficulty breathing.     resp failure  mrdd  alyssa  obesity  seizures  ? anaphylactoid rxn    IVF  I and O  renal and cardio and neuro eval  oral hygiene  skin care  suction PRN  H1 and H2 blockers   systemic steroids  s/p Epi - in the ED  pulse ox - card tele monitoring  records from [facility] and [facility] reviewed  spoke with parents  Blood gas  SPCU care and admission  serum tryptase level  TFT    
The patient is a 33 year old male with a history of HTN, HL, intellectual disability, seizure disorder who presents with respiratory distress.    Plan:  - Acute respiratory failure likely in the setting of allergic reaction/anaphylaxis  - ECG with no evidence of ischemia or infarction  - CXR with clear lungs  - Antihypertensives on hold  - Plan for possible extubation today  - After extubation, resume antihypertensives of amlodipine 10 mg daily, carvedilol 25 mg bid, clonidine 0.2 mg tid, HCTZ 25 mg daily  - Pulm follow-up
ADRIANO: Prerenal azotemia  Respiratory distress: s/p extubation  Intellectual disability  Hypertension    Improved renal indices. To continue current meds. Monitor BP trend. Titrate BP meds as needed.   Encourage PO intake as tolerated. Will follow electrolytes and renal function trend. D/w pt's father at bedside.     Further recommendations pending clinical course. Thank you for the courtesy of this referral.

## 2024-05-06 NOTE — SWALLOW BEDSIDE ASSESSMENT ADULT - SLP PERTINENT HISTORY OF CURRENT PROBLEM
Per pt's father at bedside, pt had history of choking episode in February, was at [facility], also at [facility] where he had MBS, unsure of date, MBS was unremarkable per father.  He reported pt has been maintaining a regular diet with thin liquids, no recent pna.  Pt s/p extubation this morning 8:05 per Nsg note.

## 2024-05-06 NOTE — SWALLOW BEDSIDE ASSESSMENT ADULT - COMMENTS
Chart reviewed order received for swallow eval.  Pt received upright in bed A&A, reduced cognition, on RA, SpO2 95-96%, father at bedside, pain scale 0/10 pre & post eval.  Swallow eval completed see below for details.  Pt & father educated on rx's, verbalized understanding.  Pt left as received NAD RAJESH Huffman & Dr. Ruiz notified.  Will follow.     Per charting, pt is a " Chart reviewed order received for swallow eval.  Pt received upright in bed A&A, reduced cognition, on RA, SpO2 95-96%, father at bedside, pain scale 0/10 pre & post eval.  Swallow eval completed see below for details.  Pt & father educated on rx's, verbalized understanding.  Pt left as received NAD RAJESH Huffman & Dr. Ruiz notified.  Will follow to check PO tolerance and reassess at bedside, as schedule permits.    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 Father reports that patient was visiting a pet store and suddenly developed increased work of breathing, holding his hands to his neck and wheezing. Of note, patient was recently hospitalized at [facility] for reported cardiac arrest of unknown cause and discharged on 2/8 then readmitted for choking episode. Patient has no known allergies as per father but intolerance of tegretol, and lives at home with dogs. Denies smoking. Father reports patient is ambulatory and verbal at baseline." Pt s/p extubation this morning 0805.    Chest xray 5/5/24: "No acute cardiopulmonary disease."

## 2024-05-06 NOTE — CAREGIVER ENGAGEMENT NOTE - CAREGIVER EDUCATION TEACH BACK - DATE/TIME
06-May-2024 DM (diabetes mellitus) Melolabial Transposition Flap Text: The defect edges were debeveled with a #15 scalpel blade.  Given the location of the defect and the proximity to free margins a melolabial flap was deemed most appropriate.  Using a sterile surgical marker, an appropriate melolabial transposition flap was drawn incorporating the defect.    The area thus outlined was incised deep to adipose tissue with a #15 scalpel blade.  The skin margins were undermined to an appropriate distance in all directions utilizing iris scissors.

## 2024-05-06 NOTE — CARE COORDINATION ASSESSMENT. - NSDCPLANSERVICES_GEN_ALL_CORE
To ED from  Home c/o SOB. 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 Father reports that patient was visiting a pet store and suddenly developed increased work of breathing, holding his hands to his neck and wheezing. Of note, patient was recently hospitalized at [facility] for reported cardiac arrest of unknown cause and discharged on 2/8 then readmitted for choking episode. Patient has no known allergies as per father but intolerance of tegretol, and lives at home with dogs. Denies smoking. Father reports patient is ambulatory and verbal at baseline.   D course: Respiration rate 35 on arrival, SpO2 83%. He became agitated and combative in ED, ordered IM epinephrine, IM benadryl, decadron. Patient became unresponsive and cyanotic with SpO2 80% on nonrebreather. He was intubated in ED, started on propofol and precedex drips.  Per treatment team rounds pt. extubated this morning and weaned off propofol restraints removed.  {Pt. father at bedside.    -----------------------    CM met with pt. and father Doron at bedside , introduced self and role of CM and provided discharge planning resource folder with CM / SW contact information.  Pt. on a venti Mask and his speech is not always clear at base line states father Pt. A&o x 2.  Father states pt. lives in a group home called [facility] in [place] and has about 4 steps to enter.  States e is very mobile and owns no assistive devices, he is independent in ADL's.  States pt. spends weekends in partents home in [place] at times.  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  Pt/family would like for pt. to return to prior arrangements when cleared and family or Group Home will transport.  PCP is Giselle Vizcaino [phone]  He believes the pharmacy is [facility].  Told father SW will follow w/ GRP Home./Group Home Per H&PTo ED from  Home c/o SOB. 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 Father reports that patient was visiting a pet store and suddenly developed increased work of breathing, holding his hands to his neck and wheezing. Of note, patient was recently hospitalized at [facility] for reported cardiac arrest of unknown cause and discharged on 2/8 then readmitted for choking episode. Patient has no known allergies as per father but intolerance of tegretol, and lives at home with dogs. Denies smoking. Father reports patient is ambulatory and verbal at baseline.   D course: Respiration rate 35 on arrival, SpO2 83%. He became agitated and combative in ED, ordered IM epinephrine, IM benadryl, decadron. Patient became unresponsive and cyanotic with SpO2 80% on nonrebreather. He was intubated in ED, started on propofol and precedex drips.  Per treatment team rounds pt. extubated this morning and weaned off propofol restraints removed.  {Pt. father at bedside.    -----------------------    CM met with pt. and father Doron at bedside , introduced self and role of CM and provided discharge planning resource folder with CM / SW contact information.  Pt. on a venti Mask and his speech is not always clear at base line states father Pt. A&o x 2.  Father states pt. lives in a group home called [facility] in [place] and has about 4 steps to enter.  States e is very mobile and owns no assistive devices, he is independent in ADL's.  States pt. spends weekends in partents home in [place] at times.  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  Pt/family would like for pt. to return to prior arrangements when cleared and family or Group Home will transport.  PCP is Dr. Kiser, Giselle [phone]  He believes the pharmacy is [facility].  Told father SW will follow w/ [facility] Home./Group Home

## 2024-05-06 NOTE — SWALLOW BEDSIDE ASSESSMENT ADULT - SWALLOW EVAL: PROGNOSIS
Caller: Carmelo Arnold    Relationship: Self    Best call back number: [phone]    What is the best time to reach you: ANY    Who are you requesting to speak with (clinical staff, provider,  specific staff member): CLINICAL      What was the call regarding: PT DID A TRIAL RUN OF THE ISOSORBIDE FOR HIS ANGINA AND WAS WANTING TO LET DR. MANN KNOW THAT IT HASN'T REALLY HELPED AND IF ANYTHING MADE IT WORSE. HE ALSO WOULD LIKE TO INQUIRE ABOUT PUSHING HIS APPT UP FROM HIS CT SCAN NEXT, HIS FU FOR THAT IS IN April AND WOULD LIKE THAT FU TO BE SOONER.     Do you require a callback: YES           Favorable for rx'd diet

## 2024-05-06 NOTE — SWALLOW BEDSIDE ASSESSMENT ADULT - SWALLOW EVAL: RECOMMENDED FEEDING/EATING TECHNIQUES
Ensure pt swallowed prior to administration of next bolus/allow for swallow between intakes/check mouth frequently for oral residue/pocketing/maintain upright posture during/after eating for 30 mins/oral hygiene/position upright (90 degrees)/small sips/bites

## 2024-05-06 NOTE — SWALLOW BEDSIDE ASSESSMENT ADULT - SWALLOW EVAL: FEEDING ASSISTANCE
1:1 supervision with all PO due to reduced cognition and to ensure decreased rate of intake 1:1 supervision with all PO due to reduced cognition and to ensure decreased rate of intake/no talking with PO

## 2024-05-07 ENCOUNTER — TRANSCRIPTION ENCOUNTER (OUTPATIENT)
Age: 34
End: 2024-05-07

## 2024-05-07 VITALS — DIASTOLIC BLOOD PRESSURE: 86 MMHG | SYSTOLIC BLOOD PRESSURE: 137 MMHG | HEART RATE: 86 BPM

## 2024-05-07 LAB
ALBUMIN SERPL ELPH-MCNC: 4.2 G/DL — SIGNIFICANT CHANGE UP (ref 3.3–5)
ALP SERPL-CCNC: 44 U/L — SIGNIFICANT CHANGE UP (ref 30–120)
ALT FLD-CCNC: 34 U/L — SIGNIFICANT CHANGE UP (ref 10–60)
ANION GAP SERPL CALC-SCNC: 10 MMOL/L — SIGNIFICANT CHANGE UP (ref 5–17)
AST SERPL-CCNC: 16 U/L — SIGNIFICANT CHANGE UP (ref 10–40)
BASOPHILS # BLD AUTO: 0.02 K/UL — SIGNIFICANT CHANGE UP (ref 0–0.2)
BASOPHILS NFR BLD AUTO: 0.1 % — SIGNIFICANT CHANGE UP (ref 0–2)
BILIRUB SERPL-MCNC: 0.3 MG/DL — SIGNIFICANT CHANGE UP (ref 0.2–1.2)
BUN SERPL-MCNC: 22 MG/DL — SIGNIFICANT CHANGE UP (ref 7–23)
CALCIUM SERPL-MCNC: 9.3 MG/DL — SIGNIFICANT CHANGE UP (ref 8.4–10.5)
CHLORIDE SERPL-SCNC: 106 MMOL/L — SIGNIFICANT CHANGE UP (ref 96–108)
CO2 SERPL-SCNC: 28 MMOL/L — SIGNIFICANT CHANGE UP (ref 22–31)
CREAT SERPL-MCNC: 1.16 MG/DL — SIGNIFICANT CHANGE UP (ref 0.5–1.3)
EGFR: 85 ML/MIN/1.73M2 — SIGNIFICANT CHANGE UP
EOSINOPHIL # BLD AUTO: 0 K/UL — SIGNIFICANT CHANGE UP (ref 0–0.5)
EOSINOPHIL NFR BLD AUTO: 0 % — SIGNIFICANT CHANGE UP (ref 0–6)
GLUCOSE SERPL-MCNC: 122 MG/DL — HIGH (ref 70–99)
HCT VFR BLD CALC: 42 % — SIGNIFICANT CHANGE UP (ref 39–50)
HGB BLD-MCNC: 14.2 G/DL — SIGNIFICANT CHANGE UP (ref 13–17)
IMM GRANULOCYTES NFR BLD AUTO: 0.3 % — SIGNIFICANT CHANGE UP (ref 0–0.9)
LYMPHOCYTES # BLD AUTO: 1.77 K/UL — SIGNIFICANT CHANGE UP (ref 1–3.3)
LYMPHOCYTES # BLD AUTO: 10.3 % — LOW (ref 13–44)
MAGNESIUM SERPL-MCNC: 1.8 MG/DL — SIGNIFICANT CHANGE UP (ref 1.6–2.6)
MCHC RBC-ENTMCNC: 29.2 PG — SIGNIFICANT CHANGE UP (ref 27–34)
MCHC RBC-ENTMCNC: 33.8 GM/DL — SIGNIFICANT CHANGE UP (ref 32–36)
MCV RBC AUTO: 86.2 FL — SIGNIFICANT CHANGE UP (ref 80–100)
MONOCYTES # BLD AUTO: 1.39 K/UL — HIGH (ref 0–0.9)
MONOCYTES NFR BLD AUTO: 8.1 % — SIGNIFICANT CHANGE UP (ref 2–14)
NEUTROPHILS # BLD AUTO: 13.95 K/UL — HIGH (ref 1.8–7.4)
NEUTROPHILS NFR BLD AUTO: 81.2 % — HIGH (ref 43–77)
NRBC # BLD: 0 /100 WBCS — SIGNIFICANT CHANGE UP (ref 0–0)
PHOSPHATE SERPL-MCNC: 3.4 MG/DL — SIGNIFICANT CHANGE UP (ref 2.5–4.5)
PLATELET # BLD AUTO: 292 K/UL — SIGNIFICANT CHANGE UP (ref 150–400)
POTASSIUM SERPL-MCNC: 3.5 MMOL/L — SIGNIFICANT CHANGE UP (ref 3.5–5.3)
POTASSIUM SERPL-SCNC: 3.5 MMOL/L — SIGNIFICANT CHANGE UP (ref 3.5–5.3)
PROT SERPL-MCNC: 7.8 G/DL — SIGNIFICANT CHANGE UP (ref 6–8.3)
RBC # BLD: 4.87 M/UL — SIGNIFICANT CHANGE UP (ref 4.2–5.8)
RBC # FLD: 12.2 % — SIGNIFICANT CHANGE UP (ref 10.3–14.5)
SODIUM SERPL-SCNC: 144 MMOL/L — SIGNIFICANT CHANGE UP (ref 135–145)
TRYPTASE SERPL-MCNC: 6.8 UG/L — SIGNIFICANT CHANGE UP
WBC # BLD: 17.18 K/UL — HIGH (ref 3.8–10.5)
WBC # FLD AUTO: 17.18 K/UL — HIGH (ref 3.8–10.5)

## 2024-05-07 PROCEDURE — 84443 ASSAY THYROID STIM HORMONE: CPT

## 2024-05-07 PROCEDURE — 85025 COMPLETE CBC W/AUTO DIFF WBC: CPT

## 2024-05-07 PROCEDURE — 36600 WITHDRAWAL OF ARTERIAL BLOOD: CPT

## 2024-05-07 PROCEDURE — 80053 COMPREHEN METABOLIC PANEL: CPT

## 2024-05-07 PROCEDURE — 96372 THER/PROPH/DIAG INJ SC/IM: CPT

## 2024-05-07 PROCEDURE — 92610 EVALUATE SWALLOWING FUNCTION: CPT

## 2024-05-07 PROCEDURE — 94760 N-INVAS EAR/PLS OXIMETRY 1: CPT

## 2024-05-07 PROCEDURE — 94002 VENT MGMT INPAT INIT DAY: CPT

## 2024-05-07 PROCEDURE — 99239 HOSP IP/OBS DSCHRG MGMT >30: CPT

## 2024-05-07 PROCEDURE — 84100 ASSAY OF PHOSPHORUS: CPT

## 2024-05-07 PROCEDURE — 82962 GLUCOSE BLOOD TEST: CPT

## 2024-05-07 PROCEDURE — 83520 IMMUNOASSAY QUANT NOS NONAB: CPT

## 2024-05-07 PROCEDURE — 84480 ASSAY TRIIODOTHYRONINE (T3): CPT

## 2024-05-07 PROCEDURE — 85027 COMPLETE CBC AUTOMATED: CPT

## 2024-05-07 PROCEDURE — 94003 VENT MGMT INPAT SUBQ DAY: CPT

## 2024-05-07 PROCEDURE — 96374 THER/PROPH/DIAG INJ IV PUSH: CPT

## 2024-05-07 PROCEDURE — C9254: CPT

## 2024-05-07 PROCEDURE — 84436 ASSAY OF TOTAL THYROXINE: CPT

## 2024-05-07 PROCEDURE — 94640 AIRWAY INHALATION TREATMENT: CPT

## 2024-05-07 PROCEDURE — 36415 COLL VENOUS BLD VENIPUNCTURE: CPT

## 2024-05-07 PROCEDURE — 99285 EMERGENCY DEPT VISIT HI MDM: CPT | Mod: 25

## 2024-05-07 PROCEDURE — 96375 TX/PRO/DX INJ NEW DRUG ADDON: CPT

## 2024-05-07 PROCEDURE — 71045 X-RAY EXAM CHEST 1 VIEW: CPT

## 2024-05-07 PROCEDURE — 94799 UNLISTED PULMONARY SVC/PX: CPT

## 2024-05-07 PROCEDURE — 83735 ASSAY OF MAGNESIUM: CPT

## 2024-05-07 PROCEDURE — 82803 BLOOD GASES ANY COMBINATION: CPT

## 2024-05-07 PROCEDURE — 93005 ELECTROCARDIOGRAM TRACING: CPT

## 2024-05-07 RX ORDER — EPINEPHRINE 0.3 MG/.3ML
0.3 INJECTION INTRAMUSCULAR; SUBCUTANEOUS
Qty: 3 | Refills: 0
Start: 2024-05-07

## 2024-05-07 RX ORDER — CARVEDILOL PHOSPHATE 80 MG/1
25 CAPSULE, EXTENDED RELEASE ORAL EVERY 12 HOURS
Refills: 0 | Status: DISCONTINUED | OUTPATIENT
Start: 2024-05-07 | End: 2024-05-07

## 2024-05-07 RX ADMIN — LACOSAMIDE 200 MILLIGRAM(S): 50 TABLET ORAL at 08:30

## 2024-05-07 RX ADMIN — Medication 650 MILLIGRAM(S): at 01:00

## 2024-05-07 RX ADMIN — CARVEDILOL PHOSPHATE 25 MILLIGRAM(S): 80 CAPSULE, EXTENDED RELEASE ORAL at 10:33

## 2024-05-07 RX ADMIN — QUETIAPINE FUMARATE 200 MILLIGRAM(S): 200 TABLET, FILM COATED ORAL at 05:05

## 2024-05-07 RX ADMIN — ESLICARBAZEPINE ACETATE 800 MILLIGRAM(S): 800 TABLET ORAL at 12:04

## 2024-05-07 RX ADMIN — Medication 40 MILLIGRAM(S): at 05:05

## 2024-05-07 RX ADMIN — FAMOTIDINE 20 MILLIGRAM(S): 10 INJECTION INTRAVENOUS at 05:05

## 2024-05-07 NOTE — CASE MANAGEMENT PROGRESS NOTE - NSCMPROGRESSNOTE_GEN_ALL_CORE
Update Communication Note: As per morning rounds on Special Care Unit,  Plan for go back to group home EPI/ family requesting 3 Epi pens.  D/C back to group home today. Will continue to follow patient.

## 2024-05-07 NOTE — DISCHARGE NOTE PROVIDER - HOSPITAL COURSE
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 Father reports that patient was visiting a pet store and suddenly developed increased work of breathing, holding his hands to his neck and wheezing. Of note, patient was recently hospitalized at [facility] for reported cardiac arrest of unknown cause and discharged on 2/8 then readmitted for choking episode. Patient has no known allergies as per father but intolerance of tegretol, and lives at home with dogs. Denies smoking. Father reports patient is ambulatory and verbal at baseline.     ED course: Respiration rate 35 on arrival, SpO2 83%. He became agitated and combative in ED, ordered IM epinephrine, IM benadryl, decadron. Patient became unresponsive and cyanotic with SpO2 80% on nonrebreather. He was intubated in ED, started on propofol and precedex drips.             Anaphylactic shock  -intubated in ED, on propofol and precedex and restraints. Extubated this morning  -vitals appear to have stabilized, monitor in SPCU  -amanda placed for urine output monitoring, discontinued. 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 Father reports that patient was visiting a pet store and suddenly developed increased work of breathing, holding his hands to his neck and wheezing. Of note, patient was recently hospitalized at [facility] for reported cardiac arrest of unknown cause and discharged on 2/8 then readmitted for choking episode. Patient has no known allergies as per father but intolerance of tegretol, and lives at home with dogs. Denies smoking. Father reports patient is ambulatory and verbal at baseline.     ED course: Respiration rate 35 on arrival, SpO2 83%. He became agitated and combative in ED, ordered IM epinephrine, IM benadryl, decadron. Patient became unresponsive and cyanotic with SpO2 80% on nonrebreather. He was intubated in ED, started on propofol and precedex drips.             Anaphylactic shock  -intubated in ED, on propofol and precedex and restraints. Extubated this morning  -vitals appear to have stabilized, monitor in SPCU  -amanda placed for urine output monitoring, discontinued. Successfully voiding  -gave training with father for Epipen injection PRN  -advised allergy outpatient followup given reported multiple hospitalizations this year    Seizure disorder, intellectual disability  -home vimpat switched to IV while intubated, resume PO  -resume home aptiom QD, father to bring med from home  -Resume home seroquel    HTN  -held home clonidine, HCTZ, carvedilol on arrival  -resumed home amlodipine and carvedilol while admitted, continue all home meds on discharge    HLD  -resume home simvastatin, fenofibrate

## 2024-05-07 NOTE — DIETITIAN INITIAL EVALUATION ADULT - OTHER INFO
Visited patient in room, presents with good appetite/po intake, consuming >75% of meals. Denies n/v/d/c, last BM 5/6. No reported difficulty chewing or swallowing pta; seen by Speech-Language Pathologist on 5/6 recommended soft and bite sized diet. NKFA. No reported recent wt changes, current adm weight 202#, weight appears to be stable, will continue to monitor weight trends as able.    Pertinent medications/nutrition labs reviewed;     Education is not appropriate at this time. Will c/w current diet, encourage po intake as needed. RD to continue to monitor nutrition status per protocol.

## 2024-05-07 NOTE — DISCHARGE NOTE PROVIDER - TIME SPENT: (MINUTES SPENT ON THE DISCHARGE SERVICE)
Endoscopy discharge instructions have been reviewed and given to patient. The patient verbalized understanding and acceptance of instructions. Dr. Whitney Chandra  discussed with patient procedure findings and next steps. 39

## 2024-05-07 NOTE — DISCHARGE NOTE PROVIDER - NSDCCPCAREPLAN_GEN_ALL_CORE_FT
PRINCIPAL DISCHARGE DIAGNOSIS  Diagnosis: Anaphylactic shock  Assessment and Plan of Treatment: We are sending you home with an Epi-pen to use as needed. If you need to use it, go to the nearest Emergency Department as soon as you can as the effects may wear off.   You may return to work on Thursday, May 9        SECONDARY DISCHARGE DIAGNOSES  Diagnosis: Acute respiratory failure with hypoxia  Assessment and Plan of Treatment:      PRINCIPAL DISCHARGE DIAGNOSIS  Diagnosis: Anaphylactic shock  Assessment and Plan of Treatment: We are sending you home with an Epi-pen to use as needed. If you need to use it, go to the nearest Emergency Department as soon as you can as the effects may wear off.   You may return to work and resume normal activities on Thursday, May 9. Continue all your home medications.         SECONDARY DISCHARGE DIAGNOSES  Diagnosis: Acute respiratory failure with hypoxia  Assessment and Plan of Treatment:

## 2024-05-07 NOTE — PROGRESS NOTE ADULT - SUBJECTIVE AND OBJECTIVE BOX
Chief Complaint: Respiratory distress    Interval Events: No events overnight.    Review of Systems:  General: No fevers, chills, weight gain  Skin: No rashes, color changes  Cardiovascular: No chest pain, orthopnea  Respiratory: No shortness of breath, cough  Gastrointestinal: No nausea, abdominal pain  Genitourinary: No incontinence, pain with urination  Musculoskeletal: No pain, swelling, decreased range of motion  Neurological: No headache, weakness  Psychiatric: No depression, anxiety  Endocrine: No weight gain, increased thirst  All other systems are comprehensively negative.    Physical Exam:  Vitals:        Vital Signs Last 24 Hrs  T(C): 36.6 (07 May 2024 04:06), Max: 37.2 (07 May 2024 00:00)  T(F): 97.9 (07 May 2024 04:06), Max: 98.9 (07 May 2024 00:00)  HR: 80 (07 May 2024 06:00) (57 - 97)  BP: 121/67 (07 May 2024 06:00) (116/69 - 150/131)  BP(mean): 83 (07 May 2024 06:00) (83 - 139)  RR: 19 (07 May 2024 06:00) (11 - 19)  SpO2: 95% (07 May 2024 06:00) (91% - 100%)  Parameters below as of 06 May 2024 19:00  Patient On (Oxygen Delivery Method): room air  General: NAD  HEENT: MMM  Neck: No JVD, no carotid bruit  Lungs: CTAB  CV: RRR, nl S1/S2, no M/R/G  Abdomen: S/NT/ND, +BS  Extremities: No LE edema, no cyanosis  Neuro: AAOx3, non-focal  Skin: No rash    Labs:                        14.2   17.18 )-----------( 292      ( 07 May 2024 06:00 )             42.0     05-07    144  |  106  |  22  ----------------------------<  122<H>  3.5   |  28  |  1.16    Ca    9.3      07 May 2024 06:00  Phos  3.4     05-07  Mg     1.8     05-07    TPro  7.8  /  Alb  4.2  /  TBili  0.3  /  DBili  x   /  AST  16  /  ALT  34  /  AlkPhos  44  05-07            ECG/Telemetry: Sinus rhythm

## 2024-05-07 NOTE — PROGRESS NOTE ADULT - SUBJECTIVE AND OBJECTIVE BOX
Neurology follow up note    SEAN [id number]      Interval History:    Patient feels ok no new complaints.    Allergies    Tegretol (Anaphylaxis)    Intolerances        MEDICATIONS    acetaminophen     Tablet .. 650 milliGRAM(s) Oral every 6 hours PRN  aluminum hydroxide/magnesium hydroxide/simethicone Suspension 30 milliLiter(s) Oral every 4 hours PRN  amLODIPine   Tablet 10 milliGRAM(s) Oral at bedtime  carvedilol 25 milliGRAM(s) Oral every 12 hours  enoxaparin Injectable 40 milliGRAM(s) SubCutaneous every 24 hours  eslicarbazepine 800 milliGRAM(s) Oral daily  hydrALAZINE Injectable 10 milliGRAM(s) IV Push every 6 hours PRN  lacosamide 200 milliGRAM(s) Oral two times a day  melatonin 3 milliGRAM(s) Oral at bedtime PRN  ondansetron Injectable 4 milliGRAM(s) IV Push every 8 hours PRN  QUEtiapine 200 milliGRAM(s) Oral three times a day  simvastatin 10 milliGRAM(s) Oral at bedtime  tamsulosin 0.8 milliGRAM(s) Oral at bedtime              Vital Signs Last 24 Hrs  T(C): 36.9 (07 May 2024 08:13), Max: 37.2 (07 May 2024 00:00)  T(F): 98.4 (07 May 2024 08:13), Max: 98.9 (07 May 2024 00:00)  HR: 80 (07 May 2024 06:00) (59 - 97)  BP: 121/67 (07 May 2024 06:00) (116/69 - 150/131)  BP(mean): 83 (07 May 2024 06:00) (83 - 139)  RR: 19 (07 May 2024 06:00) (11 - 19)  SpO2: 95% (07 May 2024 06:00) (91% - 100%)    Parameters below as of 06 May 2024 19:00  Patient On (Oxygen Delivery Method): room air    REVIEW OF SYSTEMS:  Slightly limited, but constitutionally the patient at present denies fevers.  Head:  No headaches.  Eyes:  No double vision or blurry vision.  Ears:  No ringing in the ears.  Neck:  No neck pain.  Cardiovascular:  No chest pain.  Respiratory:  No shortness of breath.  Abdomen:  No nausea, vomiting, or abdominal pain.  Extremities/Neurological:  No numbness or tingling.  Musculoskeletal:  No joint pain.    General Exam:      Head:  Normocephalic, atraumatic.    Eyes:  No scleral icterus.    Ears:  Hearing appeared to be intact.    Neck:  Supple.    Cardiovascular:  S1 and S2 heard.    Respiratory:  Air entry bilaterally.    Abdomen:  Soft, nontender.    Extremities:  No cyanosis were noted.    Neurologic:  The patient is awake and alert.  Extraocular movements were intact.  Speech was fluent.  Smile symmetric.  Motor, bilateral upper and lower 4/5.  Sensory intact to light touch.    LABS:  CBC Full  -  ( 07 May 2024 06:00 )  WBC Count : 17.18 K/uL  RBC Count : 4.87 M/uL  Hemoglobin : 14.2 g/dL  Hematocrit : 42.0 %  Platelet Count - Automated : 292 K/uL  Mean Cell Volume : 86.2 fl  Mean Cell Hemoglobin : 29.2 pg  Mean Cell Hemoglobin Concentration : 33.8 gm/dL  Auto Neutrophil # : 13.95 K/uL  Auto Lymphocyte # : 1.77 K/uL  Auto Monocyte # : 1.39 K/uL  Auto Eosinophil # : 0.00 K/uL  Auto Basophil # : 0.02 K/uL  Auto Neutrophil % : 81.2 %  Auto Lymphocyte % : 10.3 %  Auto Monocyte % : 8.1 %  Auto Eosinophil % : 0.0 %  Auto Basophil % : 0.1 %    Urinalysis Basic - ( 07 May 2024 06:00 )    Color: x / Appearance: x / SG: x / pH: x  Gluc: 122 mg/dL / Ketone: x  / Bili: x / Urobili: x   Blood: x / Protein: x / Nitrite: x   Leuk Esterase: x / RBC: x / WBC x   Sq Epi: x / Non Sq Epi: x / Bacteria: x      05-07    144  |  106  |  22  ----------------------------<  122<H>  3.5   |  28  |  1.16    Ca    9.3      07 May 2024 06:00  Phos  3.4     05-07  Mg     1.8     05-07    TPro  7.8  /  Alb  4.2  /  TBili  0.3  /  DBili  x   /  AST  16  /  ALT  34  /  AlkPhos  44  05-07    Hemoglobin A1C:     LIVER FUNCTIONS - ( 07 May 2024 06:00 )  Alb: 4.2 g/dL / Pro: 7.8 g/dL / ALK PHOS: 44 U/L / ALT: 34 U/L / AST: 16 U/L / GGT: x           Vitamin B12         RADIOLOGY        ANALYSIS AND PLAN:  This is a 33-year-old with episodes of altered mental status and history of epilepsy.    Episode of altered mental status, most likely metabolic encephalopathy, secondary to underlying respiratory issues.  Antibiotics needed.  Monitor respiratory status as needed.  For history of epilepsy, continue the patient on his home medications.  There is no IV form of Vimpat convert him over to the same IV form of Vimpat as oral  and if necessary can use Ativan p.r.n. for any type of breakthrough seizures.  When possible, resume the patient's home seizure medication at the same dosages.  Ativan 1 mg IV push q.6h p.r.n. for the breakthrough seizures.  Seizure precautions.    A 42 minutes of time spent with the patient, plan of care, reviewing data, and speaking to multidisciplinary healthcare team, greater than 50% time counseling care and coordination.

## 2024-05-07 NOTE — DISCHARGE NOTE NURSING/CASE MANAGEMENT/SOCIAL WORK - PATIENT PORTAL LINK FT
You can access the FollowMyHealth Patient Portal offered by [facility] by registering at the following website: [email or web address]. By joining [facility]’s FollowMyHealth portal, you will also be able to view your health information using other applications (apps) compatible with our system.

## 2024-05-07 NOTE — PROGRESS NOTE ADULT - ASSESSMENT
The patient is a 33 year old male with a history of HTN, HL, intellectual disability, seizure disorder who presents with respiratory distress.    Plan:  - Acute respiratory failure likely in the setting of allergic reaction/anaphylaxis  - ECG with no evidence of ischemia or infarction  - CXR with clear lungs  - Continue amlodipine 10 mg daily  - If BP trends up, resume carvedilol 25 mg bid, clonidine 0.2 mg tid, HCTZ 25 mg daily  - Extubated 5/6/24  - Pulm follow-up

## 2024-05-07 NOTE — DISCHARGE NOTE PROVIDER - NSDCMRMEDTOKEN_GEN_ALL_CORE_FT
alfuzosin 10 mg oral tablet, extended release: 1 tab(s) orally once a day  amLODIPine 10 mg oral tablet: 1 tab(s) orally once a day (at bedtime)  Aptiom 800 mg oral tablet: 1 tab(s) orally once a day  carvedilol 25 mg oral tablet: 1 tab(s) orally 2 times a day  cetirizine 10 mg oral tablet: 1 tab(s) orally once a day  cloNIDine 0.1 mg oral tablet: 2 tab(s) orally 3 times a day  docusate sodium 100 mg oral capsule: 1 cap(s) orally once a day  fenofibrate 48 mg oral tablet: orally once a day  hydroCHLOROthiazide 25 mg oral tablet: 1 tab(s) orally once a day  multivitamin: 1 tab(s) orally once a day  Omega-3 Fish Oil 1000 mg oral capsule: 1 cap(s) orally 2 times a day  potassium citrate - citric acid: 15 milliliter(s) orally 2 times a day  QUEtiapine 200 mg oral tablet: orally 3 times a day  simvastatin 10 mg oral tablet: 1 tab(s) orally once a day (at bedtime)  Vimpat 200 mg oral tablet: 1 tab(s) orally 2 times a day  Vimpat 50 mg oral tablet: 1 tab(s) orally 2 times a day  Vitamin D3 1000 units Po Daily:    alfuzosin 10 mg oral tablet, extended release: 1 tab(s) orally once a day  amLODIPine 10 mg oral tablet: 1 tab(s) orally once a day (at bedtime)  Aptiom 800 mg oral tablet: 1 tab(s) orally once a day  carvedilol 25 mg oral tablet: 1 tab(s) orally 2 times a day  cetirizine 10 mg oral tablet: 1 tab(s) orally once a day  cloNIDine 0.1 mg oral tablet: 2 tab(s) orally 3 times a day  docusate sodium 100 mg oral capsule: 1 cap(s) orally once a day  EPINEPHrine 0.3 mg injectable kit: 0.3 milligram(s) intramuscularly as needed for  shortness of breath and/or wheezing  fenofibrate 48 mg oral tablet: orally once a day  hydroCHLOROthiazide 25 mg oral tablet: 1 tab(s) orally once a day  multivitamin: 1 tab(s) orally once a day  Omega-3 Fish Oil 1000 mg oral capsule: 1 cap(s) orally 2 times a day  potassium citrate - citric acid: 15 milliliter(s) orally 2 times a day  QUEtiapine 200 mg oral tablet: orally 3 times a day  simvastatin 10 mg oral tablet: 1 tab(s) orally once a day (at bedtime)  Vimpat 200 mg oral tablet: 1 tab(s) orally 2 times a day  Vimpat 50 mg oral tablet: 1 tab(s) orally 2 times a day  Vitamin D3 1000 units Po Daily:

## 2024-05-07 NOTE — DISCHARGE NOTE PROVIDER - ATTENDING DISCHARGE PHYSICAL EXAMINATION:
GENERAL: Obese adult male, NAD, well-groomed, well-developed  HEAD:  Atraumatic, Normocephalic  EYES: EOMI, PERRLA, conjunctiva and sclera clear  ENMT: Moist mucous membranes, Good dentition, No lesions  NECK: Supple, No JVD appreciated  NERVOUS SYSTEM:  Alert does not appear fully oriented; All 4 extremities mobile, no gross sensory deficits.   CHEST/LUNG: Clear to auscultation bilaterally; No rales, rhonchi, wheezing, or rubs appreciated  HEART: Regular rate and rhythm; No murmurs, rubs, or gallops  ABDOMEN: Soft, Nontender, Nondistended  EXTREMITIES:  No clubbing, cyanosis, or edema appreciated  LYMPH: No lymphadenopathy noted  SKIN: No rashes or lesions appreciated

## 2024-05-07 NOTE — PROGRESS NOTE ADULT - ASSESSMENT
33-year-old male with past medical history of mental disability and seizures presents with allergic reaction.  Per father patient was at a pet store and had acute onset of difficulty breathing.     resp failure  mrdd  alyssa  obesity  seizures  ? anaphylactoid rxn    extubated  vs noted  labs reviewed  will dc Steroids  will dc H2 blocker  PO diet  meds reviewed  labs reviewed  dc planning  Allergy f/u as outpatient - discussed with parents

## 2024-05-07 NOTE — DIETITIAN INITIAL EVALUATION ADULT - PERTINENT MEDS FT
MEDICATIONS  (STANDING):  amLODIPine   Tablet 10 milliGRAM(s) Oral at bedtime  enoxaparin Injectable 40 milliGRAM(s) SubCutaneous every 24 hours  eslicarbazepine 800 milliGRAM(s) Oral daily  lacosamide 200 milliGRAM(s) Oral two times a day  QUEtiapine 200 milliGRAM(s) Oral three times a day  simvastatin 10 milliGRAM(s) Oral at bedtime  tamsulosin 0.8 milliGRAM(s) Oral at bedtime    MEDICATIONS  (PRN):  acetaminophen     Tablet .. 650 milliGRAM(s) Oral every 6 hours PRN Temp greater or equal to 38C (100.4F), Mild Pain (1 - 3)  aluminum hydroxide/magnesium hydroxide/simethicone Suspension 30 milliLiter(s) Oral every 4 hours PRN Dyspepsia  hydrALAZINE Injectable 10 milliGRAM(s) IV Push every 6 hours PRN   melatonin 3 milliGRAM(s) Oral at bedtime PRN Insomnia  ondansetron Injectable 4 milliGRAM(s) IV Push every 8 hours PRN Nausea and/or Vomiting

## 2024-05-07 NOTE — SOCIAL WORK PROGRESS NOTE - NSSWPROGRESSNOTE_GEN_ALL_CORE
SKY notified that pt is medically stable for transition back to group home. SKY spoke with Med Counselor Mona at Group home , who is aware of pt's return and is able to  pt about 11-11:30 this morning. SKY met with pt and pt's father at bedside both agreeable to pt's return. Pt's father reports he is able to drive pt back to the group home. SW received confirmation from the group home that pt's father will drive him back.  Md aware that dc note needs to include that pt can resume all prior activities and medications. Pt's father is requesting 3 epi pens to allow pt to have one at the group home, one at his father's home and the other at pt's day program. Per Group home Local Pharmacy is [facility] in [place] . SKY remains available to assist as needed.  SKY notified that pt is medically stable for transition back to group home. SKY spoke with Med Counselor Mona at Group home , who is aware of pt's return and is able to  pt about 11-11:30 this morning. SKY met with pt and pt's father at bedside both agreeable to pt's return. Pt's father reports he is able to drive pt back to the group home. SW received confirmation from the group home that pt's father will drive him back.  Md aware that dc note needs to include that pt can resume all prior activities and medications. Pt's father is requesting 3 epi pens to allow pt to have one at the group home, one at his father's home and the other at pt's day program. Md, RN and pharmacy aware.  Per Group home Local Pharmacy is [facility] in [place] . SKY remains available to assist as needed.  SKY notified that pt is medically stable for transition back to group home. SKY spoke with Med Counselor Mona at [facility] [place] , who is aware of pt's return and is able to  pt about 11-11:30 this morning. SKY met with pt and pt's father at bedside both agreeable to pt's return. Pt's father reports he is able to drive pt back to the group home. SW received confirmation from the [facility] [place] that pt's father will drive him back.  Md aware that dc note needs to include that pt can resume all prior activities and medications. Pt's father is requesting 3 epi pens to allow pt to have one at the group home, one at his father's home and the other at pt's day program. Md, RN and pharmacy aware.  Per Group home Local Pharmacy is [facility] in [place] .  Group Home staff aware that prescription was sent in and they will coordinate picking it up today. SKY remains available to assist as needed.

## 2024-05-07 NOTE — DIETITIAN INITIAL EVALUATION ADULT - PERTINENT LABORATORY DATA
05-07    144  |  106  |  22  ----------------------------<  122<H>  3.5   |  28  |  1.16    Ca    9.3      07 May 2024 06:00  Phos  3.4     05-07  Mg     1.8     05-07    TPro  7.8  /  Alb  4.2  /  TBili  0.3  /  DBili  x   /  AST  16  /  ALT  34  /  AlkPhos  44  05-07

## 2024-05-07 NOTE — DIETITIAN INITIAL EVALUATION ADULT - ORAL INTAKE PTA/DIET HISTORY
Visited pt in room, poor mentation, family at bedside provided pertinent information. Reported not following any therapeutic diet at home, eating regular foods, appetite/po intake was good. No vitamin/mineral or other nutrition supplements reported.

## 2024-05-07 NOTE — DISCHARGE NOTE PROVIDER - NSFOLLOWUPCLINICS_GEN_ALL_ED_FT
[facility] Allergy and Immunology  Allergy  [address] [place] [address]  Phone: [phone]  Fax:   Follow Up Time: 1 week

## 2024-05-07 NOTE — PROGRESS NOTE ADULT - SUBJECTIVE AND OBJECTIVE BOX
Date/Time Patient Seen:  		  Referring MD:   Data Reviewed	       Patient is a 33y old  Male who presents with a chief complaint of resp failure (06 May 2024 21:19)      Subjective/HPI     PAST MEDICAL & SURGICAL HISTORY:  MR (mental retardation), moderate    ADHD    Seizure disorder    Corpus callosum agenesis    Hypertension    CKD (chronic kidney disease), stage III    Hyperlipidemia    Seasonal allergies    Calculus of kidney    History of BPH    H/O constipation    Allergic conjunctivitis    H/O allergic rhinitis    No significant past surgical history    No significant past surgical history          Medication list         MEDICATIONS  (STANDING):  amLODIPine   Tablet 10 milliGRAM(s) Oral at bedtime  enoxaparin Injectable 40 milliGRAM(s) SubCutaneous every 24 hours  eslicarbazepine 800 milliGRAM(s) Oral daily  famotidine Injectable 20 milliGRAM(s) IV Push two times a day  lacosamide 200 milliGRAM(s) Oral two times a day  methylPREDNISolone sodium succinate Injectable 40 milliGRAM(s) IV Push every 8 hours  QUEtiapine 200 milliGRAM(s) Oral three times a day  simvastatin 10 milliGRAM(s) Oral at bedtime  tamsulosin 0.8 milliGRAM(s) Oral at bedtime    MEDICATIONS  (PRN):  acetaminophen     Tablet .. 650 milliGRAM(s) Oral every 6 hours PRN Temp greater or equal to 38C (100.4F), Mild Pain (1 - 3)  aluminum hydroxide/magnesium hydroxide/simethicone Suspension 30 milliLiter(s) Oral every 4 hours PRN Dyspepsia  hydrALAZINE Injectable 10 milliGRAM(s) IV Push every 6 hours PRN   LORazepam   Injectable 2 milliGRAM(s) IV Push every 1 hour PRN Agitation  melatonin 3 milliGRAM(s) Oral at bedtime PRN Insomnia  ondansetron Injectable 4 milliGRAM(s) IV Push every 8 hours PRN Nausea and/or Vomiting         Vitals log        ICU Vital Signs Last 24 Hrs  T(C): 36.6 (07 May 2024 04:06), Max: 37.2 (07 May 2024 00:00)  T(F): 97.9 (07 May 2024 04:06), Max: 98.9 (07 May 2024 00:00)  HR: 90 (07 May 2024 05:00) (57 - 97)  BP: 116/72 (07 May 2024 05:00) (116/69 - 150/131)  BP(mean): 86 (07 May 2024 05:00) (83 - 139)  ABP: --  ABP(mean): --  RR: 18 (07 May 2024 05:00) (11 - 18)  SpO2: 94% (07 May 2024 05:00) (91% - 100%)    O2 Parameters below as of 06 May 2024 19:00  Patient On (Oxygen Delivery Method): room air             Mode: CPAP with PS  FiO2: 30  PEEP: 5  PS: 10      Input and Output:  I&O's Detail    05 May 2024 07:01  -  06 May 2024 07:00  --------------------------------------------------------  IN:    Dexmedetomidine: 228 mL    IV PiggyBack: 50 mL    Lactated Ringers: 1440 mL    Propofol: 244.4 mL    Propofol: 20.2 mL  Total IN: 1982.6 mL    OUT:    Indwelling Catheter - Urethral (mL): 2000 mL  Total OUT: 2000 mL    Total NET: -17.4 mL      06 May 2024 07:01  -  07 May 2024 05:59  --------------------------------------------------------  IN:    Dexmedetomidine: 15 mL    IV PiggyBack: 100 mL    Lactated Ringers: 480 mL  Total IN: 595 mL    OUT:    Indwelling Catheter - Urethral (mL): 1300 mL    Propofol: 0 mL    Voided (mL): 1200 mL  Total OUT: 2500 mL    Total NET: -1905 mL          Lab Data                        14.9   10.51 )-----------( 266      ( 06 May 2024 05:55 )             42.7     05-06    143  |  106  |  18  ----------------------------<  144<H>  3.7   |  26  |  1.29    Ca    9.2      06 May 2024 05:55  Phos  2.9     05-06  Mg     1.5     05-06    TPro  7.8  /  Alb  4.0  /  TBili  0.3  /  DBili  x   /  AST  19  /  ALT  34  /  AlkPhos  40  05-06    ABG - ( 06 May 2024 06:00 )  pH, Arterial: 7.40  pH, Blood: x     /  pCO2: 49    /  pO2: 125   / HCO3: 30    / Base Excess: 5.6   /  SaO2: 99.2                    Review of Systems	      Objective     Physical Examination  heart s1s2  lung dc BS  head nc        Pertinent Lab findings & Imaging      Peggy:  NO   Adequate UO     I&O's Detail    05 May 2024 07:01  -  06 May 2024 07:00  --------------------------------------------------------  IN:    Dexmedetomidine: 228 mL    IV PiggyBack: 50 mL    Lactated Ringers: 1440 mL    Propofol: 244.4 mL    Propofol: 20.2 mL  Total IN: 1982.6 mL    OUT:    Indwelling Catheter - Urethral (mL): 2000 mL  Total OUT: 2000 mL    Total NET: -17.4 mL      06 May 2024 07:01  -  07 May 2024 05:59  --------------------------------------------------------  IN:    Dexmedetomidine: 15 mL    IV PiggyBack: 100 mL    Lactated Ringers: 480 mL  Total IN: 595 mL    OUT:    Indwelling Catheter - Urethral (mL): 1300 mL    Propofol: 0 mL    Voided (mL): 1200 mL  Total OUT: 2500 mL    Total NET: -1905 mL               Discussed with:     Cultures:	        Radiology

## 2024-05-07 NOTE — DIETITIAN INITIAL EVALUATION ADULT - REASON FOR ADMISSION
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 Father reports that patient was visiting a pet store and suddenly developed increased work of breathing, holding his hands to his neck and wheezing. Of note, patient was recently hospitalized at [facility] for reported cardiac arrest of unknown cause and discharged on 2/8 then readmitted for choking episode. Patient has no known allergies as per father but intolerance of tegretol, and lives at home with dogs. Denies smoking. Father reports patient is ambulatory and verbal at baseline." 
I will SWITCH the dose or number of times a day I take the medications listed below when I get home from the hospital:  None

## 2024-05-10 NOTE — PATIENT PROFILE ADULT - MEDICATIONS/VISITS
Pre-operative Diagnosis:  painful right lower extremity varicose veins    Post-operative diagnois:   painful right lower extremity varicose veins    Procedure:   1) right endovenous laser therapy of incompetent great saphenous vein [ x1] percutaneously under ultrasound guidance    Surgeon : Moses Hsieh MD    anesthesia: local    Estimated Blood Loss : 5 cc    Transfusions: none    Implants: none    Specimens: none    Complications: none    History:    Patient has painful right leg varicose veins and undewent the compression stocking trial period, but failed medical management.  Presents today for laser ablation of the right great saphenous vein.    All the risks and benefits of the procedure were explained to the patient, most notably deep venous thrombosis. The patient agreed to proceed and signed a written informed consent.    Procedure:    after informed consent was obtained, the patient was placed supine on the exam table.  The right lower extremity was prepped and draped in the usual sterile fashion.  The Sonosite ultrasound was used to locate the great saphenous vein at the level of the  knee .  the skin was infiltrated with 1% lidocaine.  A micropuncture needle was used to access the vein under direct ultrasound guidance.  A micro wire was passed under ultrasound visualization.  The micro sheath was passed over the wire.  The long J-wire was passed all the way up the termination of the great saphenous vein at the level of the common femoral vein.  The micro sheath was switched to the long sheath from the angiodynamics kit.  Side port was flushed.  Tumescense ( saline, sodium bicarbonate, lidocaine)  was infiltrated throughout the entire length of the great saphenous vein that was to be ablated under direct ultrasound visualization.  The tip of the laser fiber was positioned approximately 2 cm distal from the origin of the great saphenous vein and the common femoral vein.  The laser was activated at 11 riggs  and was slowly withdrawn, watching vein ablation take place under ultrasound visualization.    The total vein length ablated was  35  cm., for 178 seconds, and 1964 Joules.      The right common femoral vein was imaged with duplex pre-and post laser procedure, the  ight common femoral vein was compressible and had normal color flow tracings on Doppler pre-and post laser.    The right great saphenous vein was imaged in the thigh, duplex showed no compressibility and no color flow on Doppler, consistent with successful laser ablation closure    Steri-Strip, sterile dressings were applied, the compression stocking was placed on the patinet.     patient tolerated the procedure well, no complications.          no

## 2024-05-31 ENCOUNTER — APPOINTMENT (OUTPATIENT)
Dept: PULMONOLOGY | Facility: CLINIC | Age: 34
End: 2024-05-31
Payer: MEDICARE

## 2024-05-31 VITALS
HEIGHT: 67 IN | SYSTOLIC BLOOD PRESSURE: 128 MMHG | TEMPERATURE: 96.1 F | WEIGHT: 192.25 LBS | DIASTOLIC BLOOD PRESSURE: 82 MMHG | BODY MASS INDEX: 30.17 KG/M2 | HEART RATE: 76 BPM | RESPIRATION RATE: 16 BRPM | OXYGEN SATURATION: 96 %

## 2024-05-31 DIAGNOSIS — L20.9 ATOPIC DERMATITIS, UNSPECIFIED: ICD-10-CM

## 2024-05-31 DIAGNOSIS — J30.2 OTHER SEASONAL ALLERGIC RHINITIS: ICD-10-CM

## 2024-05-31 DIAGNOSIS — Q04.0 CONGENITAL MALFORMATIONS OF CORPUS CALLOSUM: ICD-10-CM

## 2024-05-31 DIAGNOSIS — Z82.49 FAMILY HISTORY OF ISCHEMIC HEART DISEASE AND OTHER DISEASES OF THE CIRCULATORY SYSTEM: ICD-10-CM

## 2024-05-31 DIAGNOSIS — R76.8 OTHER SPECIFIED ABNORMAL IMMUNOLOGICAL FINDINGS IN SERUM: ICD-10-CM

## 2024-05-31 DIAGNOSIS — Z82.5 FAMILY HISTORY OF ASTHMA AND OTHER CHRONIC LOWER RESPIRATORY DISEASES: ICD-10-CM

## 2024-05-31 DIAGNOSIS — F71 MODERATE INTELLECTUAL DISABILITIES: ICD-10-CM

## 2024-05-31 DIAGNOSIS — R06.83 SNORING: ICD-10-CM

## 2024-05-31 DIAGNOSIS — J30.89 OTHER ALLERGIC RHINITIS: ICD-10-CM

## 2024-05-31 DIAGNOSIS — Z86.39 PERSONAL HISTORY OF OTHER ENDOCRINE, NUTRITIONAL AND METABOLIC DISEASE: ICD-10-CM

## 2024-05-31 DIAGNOSIS — T78.2XXA ANAPHYLACTIC SHOCK, UNSPECIFIED, INITIAL ENCOUNTER: ICD-10-CM

## 2024-05-31 DIAGNOSIS — G40.909 EPILEPSY, UNSPECIFIED, NOT INTRACTABLE, W/OUT STATUS EPILEPTICUS: ICD-10-CM

## 2024-05-31 DIAGNOSIS — J96.00 ACUTE RESPIRATORY FAILURE, UNSPECIFIED WHETHER WITH HYPOXIA OR HYPERCAPNIA: ICD-10-CM

## 2024-05-31 DIAGNOSIS — Z83.3 FAMILY HISTORY OF DIABETES MELLITUS: ICD-10-CM

## 2024-05-31 PROCEDURE — 71046 X-RAY EXAM CHEST 2 VIEWS: CPT

## 2024-05-31 PROCEDURE — 99205 OFFICE O/P NEW HI 60 MIN: CPT | Mod: 25

## 2024-05-31 PROCEDURE — 94060 EVALUATION OF WHEEZING: CPT

## 2024-05-31 PROCEDURE — 94618 PULMONARY STRESS TESTING: CPT

## 2024-05-31 PROCEDURE — ZZZZZ: CPT

## 2024-05-31 RX ORDER — AZELASTINE HYDROCHLORIDE 0.5 MG/ML
0.05 SOLUTION/ DROPS OPHTHALMIC
Refills: 0 | Status: ACTIVE | COMMUNITY

## 2024-05-31 RX ORDER — FENOFIBRATE 150 MG/1
CAPSULE ORAL
Refills: 0 | Status: ACTIVE | COMMUNITY

## 2024-05-31 RX ORDER — ESLICARBAZEPINE ACETATE 800 MG/1
800 TABLET ORAL
Refills: 0 | Status: ACTIVE | COMMUNITY

## 2024-05-31 RX ORDER — SIMVASTATIN 40 MG/1
TABLET, FILM COATED ORAL
Refills: 0 | Status: ACTIVE | COMMUNITY

## 2024-05-31 RX ORDER — UBIDECARENONE/VIT E ACET 100MG-5
1000 CAPSULE ORAL
Refills: 0 | Status: ACTIVE | COMMUNITY

## 2024-05-31 RX ORDER — LACOSAMIDE 200 MG/1
200 TABLET, FILM COATED ORAL
Refills: 0 | Status: ACTIVE | COMMUNITY

## 2024-05-31 RX ORDER — ALFUZOSIN HYDROCHLORIDE 10 MG/1
10 TABLET, EXTENDED RELEASE ORAL
Refills: 0 | Status: ACTIVE | COMMUNITY

## 2024-05-31 NOTE — REASON FOR VISIT
[Initial] : an initial visit [Parent] : parent [TextBox_44] : Respiratory Arrest, likely Anaphylaxis (probably allergic), probably ADEN

## 2024-05-31 NOTE — PROCEDURE
[FreeTextEntry1] : Records reviewed from [facility]   ECHO revealed normal LVEF RV size and function.  Swallow (3.8.2024) revealed limited ability. 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 Stable from prior studies.   Multiple CBCs reviewed - no evidence of any eosinophilia or lab abnormalities noted  IgE (119)  Allergies to Cats, dogs, birch, sycamore, cottonwood, dust, oak trees  CT Angiogram (2.2024) revealed mosaic pattern of biapical lungs with bilateral subsegmental dependent atelectatic changes. No pulmonary embolus.  CXR 05/31/2024 reveals a normal sized heart; no evidence of infiltrate or effusion -- a normal appearing chest radiograph  Full PFT reveals moderate obstructive dysfunction; FEV1 was 2.26 L which is 60% of predicted with no change after using a bronchodilator; lung volumes unable; diffusion unable; poor inspiratory limb. PFTs were performed to evaluate for SOB  6 minute walk test reveals a low saturation of 93% with no evidence of dyspnea or fatigue; walked 114.1 meters  FENO was unable to be performed Fractional exhaled nitric oxide (FENO) is regarded as a simple, noninvasive method for assessing eosinophilic airway inflammation. Produced by a variety of cells within the lung, nitric oxide (NO) concentrations are generally low in healthy individuals. However, high concentrations of NO appear to be involved in nonspecific host defense mechanisms and chronic inflammatory diseases such as asthma. The American Thoracic Society (ATS) therefore has strongly recommended using FENO to aid in the assessment, management, and long-term monitoring of eosinophilic airway inflammation and asthma, and for identifying steroid responsive individuals whose chronic respiratory symptoms may be caused by airway inflammation. In their 2011 clinical practice guideline, the ATS emphasizes the importance of using FENO.

## 2024-05-31 NOTE — ASSESSMENT
[FreeTextEntry1] : Mr. TIPTON  is a 33 year old male with a history of HTN, high cholesterol, kidney stones, mental retardation (agenesis of corpus callosum), seizure disorder, atopic dermatitis, allergic rhinitis, seasonal allergies, s/p 3 episodes of "Anaphylaxis" followed by respiratory arrest, ?Anaphylaxis who now comes to the office for an initial pulmonary evaluation for Respiratory Arrest, likely Anaphylaxis (probably allergic), probably ADEN   -pulmonary disease   -s/p Respiratory Arrest    problem 1: s/p Respiratory Arrest likely Anaphylaxis (probably allergic) -f/p with Dr. Feliciano -maintained on Epi pen as needed -On Cetirizine 10 mg qD (consider BID) -On Azelastine eye drops -consider the use of Singulair 10 mg QHS and H2 blockers (Pepcid 40 mg QHS) -consider adjusting BP medications off of beta blockers due to 3  respiratory events (to be discussed with cardiologist) -?Allergy shots -recommended Air Purifier and Mattress Pillow Cover  -check Histamine levels, C1 Esterase levels, food IgE panel, eosinophil level, vitamin D -Follow up allergen blood work which was drawn off   problem 1A:  elevated IgE (119) -candidate for Xolair  -Xolair is a recombinant DNA- derived humanized IgG1K monoclonal antibody that selectively binds ot human immunoglobulin E (IgE). Xolair is produced by a Chinese hamster ovary cell suspension culture in nutrient medium containing the antibiotic gentamicin. Gentamicin is not detectable in the final product. Xolair is a sterile, white, preservative free, lyophilized powder contained in a single use vial that is reconstituted with sterile water for suspension. Side effects include: wheezing, tightness of the chest, trouble breathing, hives, skin rash, feeling anxious or light-headed, fainting, warmth or tingling under skin, or swelling of face, lips, or tongue   problem 2: Etiology of Arrest No evidence to support aspiration or underlying cardiac disease at the current time   Problem 3: Low Vitamin D -On Rx Low vitamin D levels have been associated with asthma exacerbations and increased allergic symptoms. The goal based on recent information is maintaining levels between 50-70 and low normal is 30. Recommended 50,000 units every two weeks to once a month depending on the level.   Problem 4: ?ADEN (Risk factors: elevated Mallampati class, snoring, fatigue) -get home sleep study  -Sleep apnea is associated with adverse clinical consequences which can affect most organ systems. Cardiovascular disease risk includes arrhythmias, atrial fibrillation, hypertension, coronary artery disease, and stroke. Metabolic disorders include diabetes type 2, non-alcoholic fatty liver disease. Mood disorder especially depression; and cognitive decline especially in the elderly. Associations with chronic reflux/Wilton's esophagus some but now all inclusive. -Reasons include arousal consistent with hypopnea; respiratory events most prominent in REM sleep or supine position; therefore sleep staging and body position are important for accurate diagnosis and estimation of AHI.   Problem 5: cardiac  -recommended to continue to follow up with Cardiologist   problem 6: health maintenance -recommended yearly flu shot after October 15 -recommended strep pneumonia vaccines: Prevnar-20 vaccine, followed by Pneumo vaccine 23 one year following after 65 years old. -recommended early intervention for Upper Respiratory Infections (URIs) -recommended regular osteoporosis evaluations -recommended early dermatological evaluations -recommended after the age of 50 to the age of 70, colonoscopy every 5 years   F/U in 6-8 weeks. He is encouraged to call with any changes, concerns, or questions

## 2024-05-31 NOTE — HISTORY OF PRESENT ILLNESS
[TextBox_4] : Mr. TIPTON  is a 33 year old male with a history of presenting to the office today for an initial pulmonary evaluation. His chief complaint is   *****************History limited due to the patient presenting as non-verbal with inability to phonate dramatically ******************  - he notes in 2/2024, after going in a pet shop he came out coughing and choking where he then went into cardiac arrest and was intubated - he notes he was in [facility] where he was intubated and ventilated for approximately 48 hours - he notes he was extubated and discharged where he was having post-arrest brain fog - he notes it happened again 2 more times (one of them he was intubated for a few hours) - he notes having a seizure disorder - he notes he has been using eye drops for allergic Sx - he notes having a Hx of minor seasonal allergies for which he takes Claritin  - he notes snoring - he denies any SOB  - he notes feeling fatigued frequently - he notes no witnessed apneas - he notes his memory is good - he notes he could fall asleep while watching a boring TV show - he notes occasional coughing, but nothing dramatic  - he notes he was in [facility] 5/7 - he notes seeing an allergist (Dr. Feliciano) who performed blood work but could not perform a skin test   -he denies any headaches, nausea, emesis, fever, chills, sweats, chest pain, chest pressure, wheezing, palpitations, constipation, diarrhea, vertigo, dysphagia, heartburn, reflux, itchy eyes, itchy ears, leg swelling, leg pain, arthralgias, myalgias, or sour taste in the mouth.

## 2024-05-31 NOTE — ADDENDUM
[FreeTextEntry1] : Documented by Justino Denney acting as a scribe for Dr. Kirill Mcrae on 05/31/2024.   All medical record entries made by the Scribe were at my, Dr. Kirill Mcrae's, direction and personally dictated by me on 05/31/2024. I have reviewed the chart and agree that the record accurately reflects my personal performance of the history, physical exam, assessment and plan. I have also personally directed, reviewed, and agree with the discharge instructions.

## 2024-05-31 NOTE — PHYSICAL EXAM
[No Acute Distress] : no acute distress [Normal Oropharynx] : normal oropharynx [Normal Appearance] : normal appearance [No Neck Mass] : no neck mass [Normal Rate/Rhythm] : normal rate/rhythm [Normal S1, S2] : normal s1, s2 [No Murmurs] : no murmurs [No Resp Distress] : no resp distress [Clear to Auscultation Bilaterally] : clear to auscultation bilaterally [No Abnormalities] : no abnormalities [Benign] : benign [Normal Gait] : normal gait [No Clubbing] : no clubbing [No Cyanosis] : no cyanosis [No Edema] : no edema [FROM] : FROM [Normal Color/ Pigmentation] : normal color/ pigmentation [No Focal Deficits] : no focal deficits [Oriented x3] : oriented x3 [Normal Affect] : normal affect [IV] : Mallampati Class: IV [Kyphosis] : kyphosis [TextBox_11] : right eye closure (?conjunctivitis), erythema of the sclera [TextBox_68] : I:E ratio 1:3; clear

## 2024-06-10 ENCOUNTER — OFFICE (OUTPATIENT)
Dept: URBAN - METROPOLITAN AREA CLINIC 12 | Facility: CLINIC | Age: 34
Setting detail: OPHTHALMOLOGY
End: 2024-06-10
Payer: MEDICARE

## 2024-06-10 DIAGNOSIS — D31.32: ICD-10-CM

## 2024-06-10 DIAGNOSIS — H01.001: ICD-10-CM

## 2024-06-10 DIAGNOSIS — H01.004: ICD-10-CM

## 2024-06-10 DIAGNOSIS — H16.223: ICD-10-CM

## 2024-06-10 DIAGNOSIS — H10.45: ICD-10-CM

## 2024-06-10 PROCEDURE — 92012 INTRM OPH EXAM EST PATIENT: CPT | Performed by: STUDENT IN AN ORGANIZED HEALTH CARE EDUCATION/TRAINING PROGRAM

## 2024-06-10 ASSESSMENT — LID EXAM ASSESSMENTS
OD_BLEPHARITIS: RUL 1+
OS_BLEPHARITIS: LUL 1+

## 2024-06-10 ASSESSMENT — CONFRONTATIONAL VISUAL FIELD TEST (CVF)
OS_FINDINGS: FULL
OD_FINDINGS: FULL

## 2024-07-11 ENCOUNTER — APPOINTMENT (OUTPATIENT)
Dept: CARDIOLOGY | Facility: CLINIC | Age: 34
End: 2024-07-11
Payer: MEDICARE

## 2024-07-11 VITALS
SYSTOLIC BLOOD PRESSURE: 134 MMHG | HEART RATE: 62 BPM | WEIGHT: 192 LBS | TEMPERATURE: 97.4 F | DIASTOLIC BLOOD PRESSURE: 78 MMHG | BODY MASS INDEX: 30.13 KG/M2 | OXYGEN SATURATION: 96 % | HEIGHT: 67 IN

## 2024-07-11 DIAGNOSIS — J96.00 ACUTE RESPIRATORY FAILURE, UNSPECIFIED WHETHER WITH HYPOXIA OR HYPERCAPNIA: ICD-10-CM

## 2024-07-11 DIAGNOSIS — I10 ESSENTIAL (PRIMARY) HYPERTENSION: ICD-10-CM

## 2024-07-11 PROCEDURE — 99214 OFFICE O/P EST MOD 30 MIN: CPT

## 2024-07-31 ENCOUNTER — APPOINTMENT (OUTPATIENT)
Dept: PULMONOLOGY | Facility: CLINIC | Age: 34
End: 2024-07-31
Payer: MEDICARE

## 2024-07-31 VITALS
RESPIRATION RATE: 16 BRPM | WEIGHT: 190 LBS | HEART RATE: 64 BPM | TEMPERATURE: 96.4 F | SYSTOLIC BLOOD PRESSURE: 134 MMHG | BODY MASS INDEX: 29.82 KG/M2 | OXYGEN SATURATION: 95 % | HEIGHT: 67 IN | DIASTOLIC BLOOD PRESSURE: 80 MMHG

## 2024-07-31 DIAGNOSIS — J96.00 ACUTE RESPIRATORY FAILURE, UNSPECIFIED WHETHER WITH HYPOXIA OR HYPERCAPNIA: ICD-10-CM

## 2024-07-31 DIAGNOSIS — R06.83 SNORING: ICD-10-CM

## 2024-07-31 DIAGNOSIS — L50.1 IDIOPATHIC URTICARIA: ICD-10-CM

## 2024-07-31 DIAGNOSIS — J30.89 OTHER ALLERGIC RHINITIS: ICD-10-CM

## 2024-07-31 DIAGNOSIS — L20.9 ATOPIC DERMATITIS, UNSPECIFIED: ICD-10-CM

## 2024-07-31 DIAGNOSIS — R76.8 OTHER SPECIFIED ABNORMAL IMMUNOLOGICAL FINDINGS IN SERUM: ICD-10-CM

## 2024-07-31 PROCEDURE — 99214 OFFICE O/P EST MOD 30 MIN: CPT

## 2024-07-31 PROCEDURE — G2211 COMPLEX E/M VISIT ADD ON: CPT

## 2024-07-31 NOTE — PHYSICAL EXAM
[No Acute Distress] : no acute distress [Normal Oropharynx] : normal oropharynx [III] : Mallampati Class: III [Normal Appearance] : normal appearance [No Neck Mass] : no neck mass [Normal Rate/Rhythm] : normal rate/rhythm [Normal S1, S2] : normal s1, s2 [No Murmurs] : no murmurs [No Resp Distress] : no resp distress [Clear to Auscultation Bilaterally] : clear to auscultation bilaterally [Kyphosis] : kyphosis [Benign] : benign [Normal Gait] : normal gait [No Clubbing] : no clubbing [No Cyanosis] : no cyanosis [No Edema] : no edema [FROM] : FROM [Normal Color/ Pigmentation] : normal color/ pigmentation [No Focal Deficits] : no focal deficits [Oriented x3] : oriented x3 [Normal Affect] : normal affect [TextBox_11] : right eye closure (?conjunctivitis), erythema of the sclera [TextBox_68] : I:E ratio 1:3; clear

## 2024-07-31 NOTE — HISTORY OF PRESENT ILLNESS
[TextBox_4] : Mr. TIPTON  is a 33 year old male with a history of Respiratory Arrest, likely Anaphylaxis (probably allergic), elevated IgE, probably ADEN presenting to the office today for a follow-up pulmonary evaluation. His chief complaint is   *****************History limited due to the patient presenting as non-verbal with inability to phonate dramatically ******************  -he notes feeling generally well  -he notes his sleep study is scheduled for 9/2024 -he denies having any episodes -he notes bowels are regular  -he notes appetite is stable  -he notes his eczema is flaring at this time -he notes waking up fatigued -he denies taking any new medications, vitamins, or supplements  -he denies having any seizures  -he denies any headaches, nausea, emesis, fever, chills, sweats, chest pain, chest pressure, coughing, wheezing, palpitations, diarrhea, constipation, dysphagia, vertigo, arthralgias, myalgias, leg swelling, itchy eyes, itchy ears, heartburn, reflux, or sour taste in the mouth.

## 2024-07-31 NOTE — ADDENDUM
[FreeTextEntry1] : Documented by Avani Birmingham acting as a scribe for Dr. Kirill Mcrae on 07/31/2024. All medical record entries made by the Scribe were at my, Dr. Kirill Mcrae's, direction and personally dictated by me on 07/31/2024. I have reviewed the chart and agree that the record accurately reflects my personal performance of the history, physical exam, assessment and plan. I have also personally directed, reviewed, and agree with the discharge instructions.

## 2024-07-31 NOTE — ASSESSMENT
[FreeTextEntry1] : Mr. TIPTON  is a 33 year old male with a history of HTN, high cholesterol, kidney stones, mental retardation (agenesis of corpus callosum), seizure disorder, atopic dermatitis, allergic rhinitis, seasonal allergies, s/p 3 episodes of "Anaphylaxis" followed by respiratory arrest, ?Anaphylaxis who now comes to the office for a follow-up  pulmonary evaluation for Respiratory Arrest, likely Anaphylaxis (probably allergic), elevated IgE of 119, probably ADEN- "no new events"   problem 1: known allergies, s/p Respiratory Arrest likely Anaphylaxis (probably allergic) -f/p with Dr. Feliciano -maintained on EpiPen as needed -On Cetirizine 10 mg qD (consider BID) -On Azelastine eye drops -continue Singulair 10 mg QHS and H2 blockers (Pepcid 40 mg QHS) -consider adjusting BP medications off of beta blockers due to 3  respiratory events (to be discussed with cardiologist) -?Allergy shots -recommended Air Purifier and Mattress Pillow Cover  -check Histamine levels, C1 Esterase levels, food IgE panel, eosinophil level, vitamin D -s/p allergen blood work (+) for dust  problem 1A:  elevated IgE (119), idiopathic urticaria -candidate for Xolair- initiate 7/2024  -Xolair is a recombinant DNA- derived humanized IgG1K monoclonal antibody that selectively binds ot human immunoglobulin E (IgE). Xolair is produced by a Chinese hamster ovary cell suspension culture in nutrient medium containing the antibiotic gentamicin. Gentamicin is not detectable in the final product. Xolair is a sterile, white, preservative free, lyophilized powder contained in a single use vial that is reconstituted with sterile water for suspension. Side effects include: wheezing, tightness of the chest, trouble breathing, hives, skin rash, feeling anxious or light-headed, fainting, warmth or tingling under skin, or swelling of face, lips, or tongue   problem 2: Etiology of Arrest No evidence to support aspiration or underlying cardiac disease at the current time   Problem 3: Low Vitamin D -On Rx Low vitamin D levels have been associated with asthma exacerbations and increased allergic symptoms. The goal based on recent information is maintaining levels between 50-70 and low normal is 30. Recommended 50,000 units every two weeks to once a month depending on the level.   Problem 4: ?ADEN (Risk factors: elevated Mallampati class, snoring, fatigue) -get home sleep study- pending -Sleep apnea is associated with adverse clinical consequences which can affect most organ systems. Cardiovascular disease risk includes arrhythmias, atrial fibrillation, hypertension, coronary artery disease, and stroke. Metabolic disorders include diabetes type 2, non-alcoholic fatty liver disease. Mood disorder especially depression; and cognitive decline especially in the elderly. Associations with chronic reflux/Wilton's esophagus some but now all inclusive. -Reasons include arousal consistent with hypopnea; respiratory events most prominent in REM sleep or supine position; therefore sleep staging and body position are important for accurate diagnosis and estimation of AHI.   Problem 5: cardiac  -recommended to continue to follow up with Cardiologist if needed   problem 6: health maintenance -recommended yearly flu shot after October 15 -recommended strep pneumonia vaccines: Prevnar-20 vaccine, followed by Pneumo vaccine 23 one year following after 65 years old. -recommended early intervention for Upper Respiratory Infections (URIs) -recommended regular osteoporosis evaluations -recommended early dermatological evaluations -recommended after the age of 50 to the age of 70, colonoscopy every 5 years   F/P in 3-4 months  He is encouraged to call with any changes, concerns, or questions

## 2024-07-31 NOTE — REASON FOR VISIT
[Follow-Up] : a follow-up visit [Parent] : parent [TextBox_44] : Respiratory Arrest, likely Anaphylaxis (probably allergic), elevated IgE, probably ADEN

## 2024-08-21 ENCOUNTER — APPOINTMENT (OUTPATIENT)
Dept: SLEEP CENTER | Facility: CLINIC | Age: 34
End: 2024-08-21
Payer: MEDICARE

## 2024-08-21 ENCOUNTER — OUTPATIENT (OUTPATIENT)
Dept: OUTPATIENT SERVICES | Facility: HOSPITAL | Age: 34
LOS: 1 days | End: 2024-08-21
Payer: MEDICARE

## 2024-08-21 PROCEDURE — 95800 SLP STDY UNATTENDED: CPT

## 2024-08-21 PROCEDURE — 95800 SLP STDY UNATTENDED: CPT | Mod: 26

## 2024-08-27 ENCOUNTER — APPOINTMENT (OUTPATIENT)
Dept: PULMONOLOGY | Facility: CLINIC | Age: 34
End: 2024-08-27
Payer: MEDICARE

## 2024-08-27 DIAGNOSIS — G47.30 SLEEP APNEA, UNSPECIFIED: ICD-10-CM

## 2024-08-27 DIAGNOSIS — G47.33 OBSTRUCTIVE SLEEP APNEA (ADULT) (PEDIATRIC): ICD-10-CM

## 2024-08-27 PROCEDURE — 99441: CPT | Mod: 93

## 2024-08-29 DIAGNOSIS — G47.33 OBSTRUCTIVE SLEEP APNEA (ADULT) (PEDIATRIC): ICD-10-CM

## 2024-09-03 ENCOUNTER — LABORATORY RESULT (OUTPATIENT)
Age: 34
End: 2024-09-03

## 2024-09-04 DIAGNOSIS — R89.9 UNSPECIFIED ABNORMAL FINDING IN SPECIMENS FROM OTHER ORGANS, SYSTEMS AND TISSUES: ICD-10-CM

## 2024-09-09 LAB — STRONGYLOIDES AB SER IA-ACNC: NEGATIVE

## 2024-09-19 ENCOUNTER — APPOINTMENT (OUTPATIENT)
Age: 34
End: 2024-09-19
Payer: MEDICAID

## 2024-09-19 ENCOUNTER — APPOINTMENT (OUTPATIENT)
Age: 34
End: 2024-09-19

## 2024-09-19 PROCEDURE — ZZZZZ: CPT

## 2024-09-23 ENCOUNTER — RX RENEWAL (OUTPATIENT)
Age: 34
End: 2024-09-23

## 2024-09-24 ENCOUNTER — APPOINTMENT (OUTPATIENT)
Dept: PULMONOLOGY | Facility: CLINIC | Age: 34
End: 2024-09-24

## 2024-10-31 RX ORDER — EPINEPHRINE 0.3 MG/.3ML
0.3 INJECTION INTRAMUSCULAR
Qty: 1 | Refills: 1 | Status: ACTIVE | COMMUNITY
Start: 2024-10-24 | End: 1900-01-01

## 2024-11-01 ENCOUNTER — APPOINTMENT (OUTPATIENT)
Dept: PULMONOLOGY | Facility: CLINIC | Age: 34
End: 2024-11-01
Payer: MEDICARE

## 2024-11-01 VITALS
SYSTOLIC BLOOD PRESSURE: 140 MMHG | OXYGEN SATURATION: 97 % | RESPIRATION RATE: 18 BRPM | WEIGHT: 194 LBS | TEMPERATURE: 97.9 F | HEIGHT: 67 IN | HEART RATE: 73 BPM | DIASTOLIC BLOOD PRESSURE: 92 MMHG | BODY MASS INDEX: 30.45 KG/M2

## 2024-11-01 DIAGNOSIS — G47.30 SLEEP APNEA, UNSPECIFIED: ICD-10-CM

## 2024-11-01 DIAGNOSIS — J30.9 ALLERGIC RHINITIS, UNSPECIFIED: ICD-10-CM

## 2024-11-01 DIAGNOSIS — R76.8 OTHER SPECIFIED ABNORMAL IMMUNOLOGICAL FINDINGS IN SERUM: ICD-10-CM

## 2024-11-01 DIAGNOSIS — J96.00 ACUTE RESPIRATORY FAILURE, UNSPECIFIED WHETHER WITH HYPOXIA OR HYPERCAPNIA: ICD-10-CM

## 2024-11-01 DIAGNOSIS — L50.1 IDIOPATHIC URTICARIA: ICD-10-CM

## 2024-11-01 DIAGNOSIS — J30.89 OTHER ALLERGIC RHINITIS: ICD-10-CM

## 2024-11-01 PROCEDURE — 96372 THER/PROPH/DIAG INJ SC/IM: CPT

## 2024-11-01 PROCEDURE — 99214 OFFICE O/P EST MOD 30 MIN: CPT | Mod: 25

## 2024-11-01 PROCEDURE — ZZZZZ: CPT

## 2024-11-01 RX ORDER — OMALIZUMAB 300 MG/2ML
300 INJECTION, SOLUTION SUBCUTANEOUS
Qty: 0 | Refills: 0 | Status: COMPLETED | OUTPATIENT
Start: 2024-11-01

## 2024-12-03 ENCOUNTER — APPOINTMENT (OUTPATIENT)
Dept: PULMONOLOGY | Facility: CLINIC | Age: 34
End: 2024-12-03
Payer: MEDICARE

## 2024-12-03 PROCEDURE — 96372 THER/PROPH/DIAG INJ SC/IM: CPT

## 2024-12-03 RX ORDER — OMALIZUMAB 300 MG/2ML
300 INJECTION, SOLUTION SUBCUTANEOUS
Qty: 0 | Refills: 0 | Status: COMPLETED | OUTPATIENT
Start: 2024-12-03

## 2024-12-12 ENCOUNTER — APPOINTMENT (OUTPATIENT)
Dept: CARDIOLOGY | Facility: CLINIC | Age: 34
End: 2024-12-12
Payer: MEDICARE

## 2024-12-12 VITALS
HEART RATE: 77 BPM | OXYGEN SATURATION: 97 % | WEIGHT: 205 LBS | BODY MASS INDEX: 32.18 KG/M2 | HEIGHT: 67 IN | DIASTOLIC BLOOD PRESSURE: 88 MMHG | SYSTOLIC BLOOD PRESSURE: 100 MMHG

## 2024-12-12 DIAGNOSIS — J96.00 ACUTE RESPIRATORY FAILURE, UNSPECIFIED WHETHER WITH HYPOXIA OR HYPERCAPNIA: ICD-10-CM

## 2024-12-12 DIAGNOSIS — I10 ESSENTIAL (PRIMARY) HYPERTENSION: ICD-10-CM

## 2024-12-12 PROCEDURE — 99213 OFFICE O/P EST LOW 20 MIN: CPT

## 2025-01-02 ENCOUNTER — APPOINTMENT (OUTPATIENT)
Dept: PULMONOLOGY | Facility: CLINIC | Age: 35
End: 2025-01-02
Payer: MEDICARE

## 2025-01-02 VITALS
BODY MASS INDEX: 31.86 KG/M2 | DIASTOLIC BLOOD PRESSURE: 88 MMHG | TEMPERATURE: 97.2 F | WEIGHT: 203 LBS | RESPIRATION RATE: 16 BRPM | SYSTOLIC BLOOD PRESSURE: 138 MMHG | OXYGEN SATURATION: 95 % | HEIGHT: 67 IN | HEART RATE: 67 BPM

## 2025-01-02 PROCEDURE — 96372 THER/PROPH/DIAG INJ SC/IM: CPT

## 2025-01-02 RX ORDER — OMALIZUMAB 300 MG/2ML
300 INJECTION, SOLUTION SUBCUTANEOUS
Qty: 0 | Refills: 0 | Status: COMPLETED | OUTPATIENT
Start: 2025-01-02

## 2025-01-30 ENCOUNTER — RX RENEWAL (OUTPATIENT)
Age: 35
End: 2025-01-30

## 2025-03-19 ENCOUNTER — APPOINTMENT (OUTPATIENT)
Dept: PULMONOLOGY | Facility: CLINIC | Age: 35
End: 2025-03-19
Payer: MEDICARE

## 2025-03-19 VITALS
WEIGHT: 203 LBS | BODY MASS INDEX: 31.86 KG/M2 | SYSTOLIC BLOOD PRESSURE: 138 MMHG | DIASTOLIC BLOOD PRESSURE: 86 MMHG | HEIGHT: 67 IN | HEART RATE: 67 BPM | RESPIRATION RATE: 18 BRPM | TEMPERATURE: 97.7 F | OXYGEN SATURATION: 95 %

## 2025-03-19 PROCEDURE — 96372 THER/PROPH/DIAG INJ SC/IM: CPT

## 2025-03-19 RX ORDER — OMALIZUMAB 150 MG/ML
150 INJECTION, SOLUTION SUBCUTANEOUS
Qty: 0 | Refills: 0 | Status: COMPLETED | OUTPATIENT
Start: 2025-03-19

## 2025-04-01 ENCOUNTER — APPOINTMENT (OUTPATIENT)
Dept: PULMONOLOGY | Facility: CLINIC | Age: 35
End: 2025-04-01
Payer: MEDICARE

## 2025-04-01 VITALS
OXYGEN SATURATION: 96 % | BODY MASS INDEX: 31.86 KG/M2 | HEART RATE: 74 BPM | RESPIRATION RATE: 16 BRPM | WEIGHT: 203 LBS | SYSTOLIC BLOOD PRESSURE: 132 MMHG | DIASTOLIC BLOOD PRESSURE: 76 MMHG | HEIGHT: 67 IN

## 2025-04-01 DIAGNOSIS — G47.33 OBSTRUCTIVE SLEEP APNEA (ADULT) (PEDIATRIC): ICD-10-CM

## 2025-04-01 DIAGNOSIS — L50.1 IDIOPATHIC URTICARIA: ICD-10-CM

## 2025-04-01 DIAGNOSIS — R76.8 OTHER SPECIFIED ABNORMAL IMMUNOLOGICAL FINDINGS IN SERUM: ICD-10-CM

## 2025-04-01 DIAGNOSIS — J96.00 ACUTE RESPIRATORY FAILURE, UNSPECIFIED WHETHER WITH HYPOXIA OR HYPERCAPNIA: ICD-10-CM

## 2025-04-01 DIAGNOSIS — G47.30 SLEEP APNEA, UNSPECIFIED: ICD-10-CM

## 2025-04-01 DIAGNOSIS — J30.9 ALLERGIC RHINITIS, UNSPECIFIED: ICD-10-CM

## 2025-04-01 PROCEDURE — 94010 BREATHING CAPACITY TEST: CPT

## 2025-04-01 PROCEDURE — 99214 OFFICE O/P EST MOD 30 MIN: CPT | Mod: 25

## 2025-04-03 ENCOUNTER — APPOINTMENT (OUTPATIENT)
Age: 35
End: 2025-04-03

## 2025-04-03 ENCOUNTER — OUTPATIENT (OUTPATIENT)
Dept: OUTPATIENT SERVICES | Facility: HOSPITAL | Age: 35
LOS: 1 days | End: 2025-04-03
Payer: MEDICARE

## 2025-04-03 PROCEDURE — D0150: CPT

## 2025-04-03 PROCEDURE — ZZZZZ: CPT

## 2025-04-03 PROCEDURE — D1110: CPT

## 2025-04-16 ENCOUNTER — APPOINTMENT (OUTPATIENT)
Dept: PULMONOLOGY | Facility: CLINIC | Age: 35
End: 2025-04-16
Payer: MEDICARE

## 2025-04-16 VITALS
HEIGHT: 67 IN | BODY MASS INDEX: 31.86 KG/M2 | RESPIRATION RATE: 16 BRPM | TEMPERATURE: 97.3 F | SYSTOLIC BLOOD PRESSURE: 120 MMHG | DIASTOLIC BLOOD PRESSURE: 82 MMHG | WEIGHT: 203 LBS | OXYGEN SATURATION: 95 % | HEART RATE: 64 BPM

## 2025-04-16 PROCEDURE — 96372 THER/PROPH/DIAG INJ SC/IM: CPT

## 2025-04-16 RX ORDER — OMALIZUMAB 300 MG/2ML
300 INJECTION, SOLUTION SUBCUTANEOUS
Qty: 0 | Refills: 0 | Status: COMPLETED | OUTPATIENT
Start: 2025-04-16

## 2025-04-24 ENCOUNTER — OFFICE (OUTPATIENT)
Dept: URBAN - METROPOLITAN AREA CLINIC 70 | Facility: CLINIC | Age: 35
Setting detail: OPHTHALMOLOGY
End: 2025-04-24
Payer: MEDICARE

## 2025-04-24 DIAGNOSIS — H01.004: ICD-10-CM

## 2025-04-24 DIAGNOSIS — H16.223: ICD-10-CM

## 2025-04-24 DIAGNOSIS — H01.001: ICD-10-CM

## 2025-04-24 DIAGNOSIS — H10.45: ICD-10-CM

## 2025-04-24 DIAGNOSIS — D31.32: ICD-10-CM

## 2025-04-24 PROCEDURE — 92014 COMPRE OPH EXAM EST PT 1/>: CPT | Performed by: STUDENT IN AN ORGANIZED HEALTH CARE EDUCATION/TRAINING PROGRAM

## 2025-04-24 ASSESSMENT — LID EXAM ASSESSMENTS
OS_BLEPHARITIS: LUL 1+
OD_BLEPHARITIS: RUL 1+

## 2025-04-24 ASSESSMENT — KERATOMETRY
OS_K1POWER_DIOPTERS: UNABLE
OD_K1POWER_DIOPTERS: UNABLE

## 2025-04-24 ASSESSMENT — REFRACTION_AUTOREFRACTION
OD_SPHERE: UNABLE
OS_SPHERE: UNABLE

## 2025-04-24 ASSESSMENT — VISUAL ACUITY
OD_BCVA: F&F
OS_BCVA: F&F

## 2025-04-24 ASSESSMENT — CONFRONTATIONAL VISUAL FIELD TEST (CVF)
OS_FINDINGS: FULL
OD_FINDINGS: FULL

## 2025-05-12 ENCOUNTER — RX RENEWAL (OUTPATIENT)
Age: 35
End: 2025-05-12

## 2025-05-14 ENCOUNTER — APPOINTMENT (OUTPATIENT)
Dept: PULMONOLOGY | Facility: CLINIC | Age: 35
End: 2025-05-14
Payer: MEDICARE

## 2025-05-14 VITALS
BODY MASS INDEX: 31.08 KG/M2 | OXYGEN SATURATION: 95 % | WEIGHT: 198 LBS | SYSTOLIC BLOOD PRESSURE: 132 MMHG | RESPIRATION RATE: 18 BRPM | TEMPERATURE: 97.9 F | HEART RATE: 76 BPM | DIASTOLIC BLOOD PRESSURE: 94 MMHG | HEIGHT: 67 IN

## 2025-05-14 PROCEDURE — 96372 THER/PROPH/DIAG INJ SC/IM: CPT

## 2025-05-14 RX ORDER — OMALIZUMAB 300 MG/2ML
300 INJECTION, SOLUTION SUBCUTANEOUS
Qty: 0 | Refills: 0 | Status: COMPLETED | OUTPATIENT
Start: 2025-05-13

## 2025-05-29 ENCOUNTER — RX RENEWAL (OUTPATIENT)
Age: 35
End: 2025-05-29

## 2025-06-18 ENCOUNTER — APPOINTMENT (OUTPATIENT)
Dept: PULMONOLOGY | Facility: CLINIC | Age: 35
End: 2025-06-18
Payer: MEDICARE

## 2025-06-18 VITALS
HEIGHT: 67 IN | RESPIRATION RATE: 16 BRPM | SYSTOLIC BLOOD PRESSURE: 120 MMHG | DIASTOLIC BLOOD PRESSURE: 84 MMHG | OXYGEN SATURATION: 95 % | TEMPERATURE: 97.7 F | HEART RATE: 68 BPM | WEIGHT: 205 LBS | BODY MASS INDEX: 32.18 KG/M2

## 2025-06-18 PROCEDURE — 96372 THER/PROPH/DIAG INJ SC/IM: CPT

## 2025-06-18 RX ORDER — OMALIZUMAB 300 MG/2ML
300 INJECTION, SOLUTION SUBCUTANEOUS
Qty: 0 | Refills: 0 | Status: COMPLETED | OUTPATIENT
Start: 2025-06-18

## 2025-06-19 ENCOUNTER — APPOINTMENT (OUTPATIENT)
Dept: CARDIOLOGY | Facility: CLINIC | Age: 35
End: 2025-06-19

## 2025-06-24 DIAGNOSIS — Z01.20 ENCOUNTER FOR DENTAL EXAMINATION AND CLEANING WITHOUT ABNORMAL FINDINGS: ICD-10-CM

## 2025-06-30 ENCOUNTER — APPOINTMENT (OUTPATIENT)
Dept: CARDIOLOGY | Facility: CLINIC | Age: 35
End: 2025-06-30
Payer: MEDICARE

## 2025-06-30 VITALS
DIASTOLIC BLOOD PRESSURE: 82 MMHG | HEART RATE: 66 BPM | BODY MASS INDEX: 32.11 KG/M2 | OXYGEN SATURATION: 95 % | WEIGHT: 205 LBS | SYSTOLIC BLOOD PRESSURE: 124 MMHG

## 2025-06-30 PROCEDURE — 99212 OFFICE O/P EST SF 10 MIN: CPT

## 2025-08-13 ENCOUNTER — APPOINTMENT (OUTPATIENT)
Dept: PULMONOLOGY | Facility: CLINIC | Age: 35
End: 2025-08-13
Payer: MEDICARE

## 2025-08-13 VITALS
HEART RATE: 72 BPM | DIASTOLIC BLOOD PRESSURE: 88 MMHG | TEMPERATURE: 97.6 F | RESPIRATION RATE: 16 BRPM | OXYGEN SATURATION: 96 % | BODY MASS INDEX: 32.02 KG/M2 | HEIGHT: 67 IN | WEIGHT: 204 LBS | SYSTOLIC BLOOD PRESSURE: 130 MMHG

## 2025-08-13 DIAGNOSIS — R76.8 OTHER SPECIFIED ABNORMAL IMMUNOLOGICAL FINDINGS IN SERUM: ICD-10-CM

## 2025-08-13 DIAGNOSIS — J30.89 OTHER ALLERGIC RHINITIS: ICD-10-CM

## 2025-08-13 DIAGNOSIS — G47.33 OBSTRUCTIVE SLEEP APNEA (ADULT) (PEDIATRIC): ICD-10-CM

## 2025-08-13 DIAGNOSIS — J30.9 ALLERGIC RHINITIS, UNSPECIFIED: ICD-10-CM

## 2025-08-13 PROCEDURE — ZZZZZ: CPT

## 2025-08-13 PROCEDURE — 96372 THER/PROPH/DIAG INJ SC/IM: CPT

## 2025-08-13 PROCEDURE — 99214 OFFICE O/P EST MOD 30 MIN: CPT | Mod: 25

## 2025-09-10 ENCOUNTER — APPOINTMENT (OUTPATIENT)
Dept: PULMONOLOGY | Facility: CLINIC | Age: 35
End: 2025-09-10
Payer: MEDICARE

## 2025-09-10 VITALS
SYSTOLIC BLOOD PRESSURE: 127 MMHG | TEMPERATURE: 97.6 F | DIASTOLIC BLOOD PRESSURE: 78 MMHG | OXYGEN SATURATION: 98 % | HEART RATE: 70 BPM | RESPIRATION RATE: 17 BRPM | HEIGHT: 67 IN | WEIGHT: 204 LBS | BODY MASS INDEX: 32.02 KG/M2

## 2025-09-10 PROCEDURE — 96372 THER/PROPH/DIAG INJ SC/IM: CPT

## 2025-09-10 RX ORDER — OMALIZUMAB 300 MG/2ML
300 INJECTION, SOLUTION SUBCUTANEOUS
Qty: 0 | Refills: 0 | Status: COMPLETED | OUTPATIENT
Start: 2025-09-10